# Patient Record
Sex: FEMALE | Race: BLACK OR AFRICAN AMERICAN | NOT HISPANIC OR LATINO | Employment: FULL TIME | URBAN - METROPOLITAN AREA
[De-identification: names, ages, dates, MRNs, and addresses within clinical notes are randomized per-mention and may not be internally consistent; named-entity substitution may affect disease eponyms.]

---

## 2020-07-14 ENCOUNTER — HOSPITAL ENCOUNTER (EMERGENCY)
Facility: HOSPITAL | Age: 20
Discharge: HOME | End: 2020-07-14
Attending: EMERGENCY MEDICINE
Payer: COMMERCIAL

## 2020-07-14 VITALS
TEMPERATURE: 97.8 F | RESPIRATION RATE: 18 BRPM | OXYGEN SATURATION: 100 % | WEIGHT: 158.8 LBS | SYSTOLIC BLOOD PRESSURE: 133 MMHG | DIASTOLIC BLOOD PRESSURE: 70 MMHG | HEART RATE: 67 BPM

## 2020-07-14 DIAGNOSIS — R11.2 NAUSEA AND VOMITING: ICD-10-CM

## 2020-07-14 DIAGNOSIS — R10.30 LOWER ABDOMINAL PAIN: Primary | ICD-10-CM

## 2020-07-14 LAB
BACTERIA UR QL AUTO: ABNORMAL /HPF
BILIRUB UR QL STRIP: ABNORMAL
CLARITY UR: ABNORMAL
COLOR UR: YELLOW
COLOR, POC: NORMAL
EXT PREG TEST URINE: NEGATIVE
EXT. CONTROL ED NAV: NORMAL
GLUCOSE UR STRIP-MCNC: NEGATIVE MG/DL
HGB UR QL STRIP.AUTO: ABNORMAL
KETONES UR STRIP-MCNC: ABNORMAL MG/DL
LEUKOCYTE ESTERASE UR QL STRIP: NEGATIVE
NITRITE UR QL STRIP: NEGATIVE
NON-SQ EPI CELLS URNS QL MICRO: ABNORMAL /HPF
PH UR STRIP.AUTO: 5.5 [PH] (ref 4.5–8)
PROT UR STRIP-MCNC: ABNORMAL MG/DL
RBC #/AREA URNS AUTO: ABNORMAL /HPF
SP GR UR STRIP.AUTO: >=1.03 (ref 1–1.03)
UROBILINOGEN UR QL STRIP.AUTO: 0.2 E.U./DL
WBC #/AREA URNS AUTO: ABNORMAL /HPF

## 2020-07-14 PROCEDURE — 99284 EMERGENCY DEPT VISIT MOD MDM: CPT | Performed by: EMERGENCY MEDICINE

## 2020-07-14 PROCEDURE — 99284 EMERGENCY DEPT VISIT MOD MDM: CPT

## 2020-07-14 PROCEDURE — 81025 URINE PREGNANCY TEST: CPT | Performed by: EMERGENCY MEDICINE

## 2020-07-14 PROCEDURE — 96372 THER/PROPH/DIAG INJ SC/IM: CPT

## 2020-07-14 PROCEDURE — 81001 URINALYSIS AUTO W/SCOPE: CPT

## 2020-07-14 RX ORDER — ONDANSETRON 4 MG/1
4 TABLET, ORALLY DISINTEGRATING ORAL ONCE
Status: COMPLETED | OUTPATIENT
Start: 2020-07-14 | End: 2020-07-14

## 2020-07-14 RX ORDER — KETOROLAC TROMETHAMINE 30 MG/ML
15 INJECTION, SOLUTION INTRAMUSCULAR; INTRAVENOUS ONCE
Status: COMPLETED | OUTPATIENT
Start: 2020-07-14 | End: 2020-07-14

## 2020-07-14 RX ORDER — ACETAMINOPHEN 325 MG/1
650 TABLET ORAL ONCE
Status: DISCONTINUED | OUTPATIENT
Start: 2020-07-14 | End: 2020-07-14 | Stop reason: HOSPADM

## 2020-07-14 RX ORDER — ONDANSETRON 4 MG/1
4 TABLET, FILM COATED ORAL EVERY 6 HOURS
Qty: 12 TABLET | Refills: 0 | Status: SHIPPED | OUTPATIENT
Start: 2020-07-14

## 2020-07-14 RX ADMIN — KETOROLAC TROMETHAMINE 15 MG: 30 INJECTION, SOLUTION INTRAMUSCULAR at 13:29

## 2020-07-14 RX ADMIN — ONDANSETRON 4 MG: 4 TABLET, ORALLY DISINTEGRATING ORAL at 13:29

## 2020-07-14 NOTE — ED ATTENDING ATTESTATION
7/14/2020  Last WHITMORE [id number], DO, saw and evaluated the patient  I have discussed the patient with the resident/non-physician practitioner and agree with the resident's/non-physician practitioner's findings, Plan of Care, and MDM as documented in the resident's/non-physician practitioner's note, except where noted  All available labs and Radiology studies were reviewed  I was present for key portions of any procedure(s) performed by the resident/non-physician practitioner and I was immediately available to provide assistance  At this point I agree with the current assessment done in the Emergency Department  I have conducted an independent evaluation of this patient a history and physical is as follows:    Patient is a 17-year-old female, presents today for evaluation of abdominal pelvic cramping and nausea and vomiting  She says she has a history of heavy menstrual cycles with significant cramping  Today she started her  menstrual cycle, her cycles are normally irregular, and while in a training session for work, felt nauseous, went into the bathroom and had 2 episodes of nonbloody, nonbilious emesis  Her discomfort is diffuse but greatest in the left lower quadrant  Was gradual in onset, not maximal in onset and not sudden in onset  Feels like her prior menstrual cycles but just worse cramping  There is no travel history, no sick contacts, no dysuria or hematuria  She is not sexually active  No anorexia or migration to the symptoms  No cough, no fever or chills  She says while vomiting at work, someone called EMS  General:  Patient is well-appearing  Head:  Atraumatic  Eyes:  Conjunctiva pink  ENT:  Mucous membranes are moist  Neck:  Supple  Cardiac:  S1-S2, without murmurs  Lungs:  Clear to auscultation bilaterally  Abdomen:  Mild diffuse abdominal tenderness, greatest in the left lower quadrant    No tympany, no rigidity, no guarding, no CVA tenderness  Extremities:  Normal range of motion  Neurologic:  Awake, fluent speech, normal comprehension, AAOx3  Skin:  Pink warm and dry  Psychiatric:  Alert, pleasant, cooperative      ED Course     Labs Reviewed   URINE MACROSCOPIC, POC - Abnormal       Result Value Ref Range Status    Color, UA Yellow   Final    Clarity, UA Cloudy   Final    pH, UA 5 5  4 5 - 8 0 Final    Leukocytes, UA Negative  Negative Final    Nitrite, UA Negative  Negative Final    Protein, UA 30 (1+) (*) Negative mg/dl Final    Glucose, UA Negative  Negative mg/dl Final    Ketones, UA >=160 (4+) (*) Negative mg/dl Final    Urobilinogen, UA 0 2  0 2, 1 0 E U /dl E U /dl Final    Bilirubin, UA Interference- unable to analyze (*) Negative Final    Comment: The dipstick result may be falsely positive due to interfering substances  We recommend reliance upon serum bilirubin, liver & kidney function tests to guide patient care if clinically indicated  Blood, UA Large (*) Negative Final    Specific Gravity, UA >=1 030  1 003 - 1 030 Final    Narrative:     CLINITEK RESULT   POCT URINALYSIS DIPSTICK - Normal    Color, UA see results      POCT PREGNANCY, URINE - Normal    EXT PREG TEST UR (Ref: Negative) negative   Final    Control valid   Final   URINE MICROSCOPIC       On reassessment the patient was feeling better  Suspect her symptoms are secondary her menstrual cramping  Do not believe this represents acute ovarian torsion  While the cause of the patient's complaints is most likely benign, it is possible that this is the early presentation of a more serious condition  This diagnostic uncertainty was discussed with the patient, the importance of follow up care, as well as the need to return to immediately return to the closest emergency department for the concerning signs and symptoms listed in the discharge instructions  The patient stated they were aware of this diagnostic uncertainty, understood the importance of follow up and were comfortable being discharged   I believe that discharge home with outpatient follow up for further evaluation is medically appropriate  Supportive care, importance of follow-up and return precautions were discussed with the patient, who expressed understanding            Critical Care Time  Procedures

## 2020-07-14 NOTE — ED PROVIDER NOTES
History  Chief Complaint   Patient presents with    Abdominal Pain     pt reports abdominal pain and cramping that started around 1030, reprots two episodes of vomiting and nausea; denies D/C     23year-old female history of heavy menstrual bleeding and significant cramping presents for evaluation of lower abdominal pain and nausea and vomiting  She states her menses began this morning and while at orientation for a job, experienced lower abdominal cramping associated with nausea and 2 episodes of nonbloody nonbilious vomiting  Someone in the bathroom then called EMS  She states the pain is primarily in her lower abdomen is cramping in nature  The pain feels similar to prior menses  It is nonradiating  No obvious worsening or alleviating factors  She did not take any medications prior to arrival   No recent trauma or falls  No prior abdominal surgeries  No constipation or diarrhea  No recent antibiotic use or hospitalizations  No recent travel outside of the country  She is not currently sexually active  She denies any dysuria, hematuria, urinary urgency or frequency  No vaginal discharge  No headache, fever, chills, chest pain, shortness of breath  None       History reviewed  No pertinent past medical history  History reviewed  No pertinent surgical history  History reviewed  No pertinent family history  I have reviewed and agree with the history as documented  E-Cigarette/Vaping    E-Cigarette Use Never User      E-Cigarette/Vaping Substances     Social History     Tobacco Use    Smoking status: Current Some Day Smoker     Types: Cigars    Smokeless tobacco: Never Used   Substance Use Topics    Alcohol use: Not Currently     Comment: socailly     Drug use: Not Currently        Review of Systems   Constitutional: Negative for activity change, chills, diaphoresis and fever  HENT: Negative for congestion, rhinorrhea, sore throat and tinnitus      Eyes: Negative for photophobia, redness and visual disturbance  Respiratory: Negative for cough, chest tightness, shortness of breath and wheezing  Cardiovascular: Negative for chest pain, palpitations and leg swelling  Gastrointestinal: Positive for abdominal pain, nausea and vomiting  Negative for abdominal distention, blood in stool, constipation and diarrhea  Genitourinary: Positive for vaginal bleeding  Negative for dysuria, enuresis, flank pain, frequency, hematuria, urgency, vaginal discharge and vaginal pain  Musculoskeletal: Negative for neck pain and neck stiffness  Skin: Negative for rash and wound  Neurological: Negative for dizziness, seizures, syncope, light-headedness and headaches  Hematological: Negative  Psychiatric/Behavioral: Negative  Physical Exam  ED Triage Vitals [07/14/20 1308]   Temperature Pulse Respirations Blood Pressure SpO2   97 8 °F (36 6 °C) 67 18 133/70 100 %      Temp src Heart Rate Source Patient Position - Orthostatic VS BP Location FiO2 (%)   -- Monitor Lying Left arm --      Pain Score       9             Orthostatic Vital Signs  Vitals:    07/14/20 1308   BP: 133/70   Pulse: 67   Patient Position - Orthostatic VS: Lying       Physical Exam   Constitutional: She is oriented to person, place, and time  She appears well-developed and well-nourished  No distress  HENT:   Head: Normocephalic and atraumatic  Right Ear: External ear normal    Left Ear: External ear normal    Eyes: Pupils are equal, round, and reactive to light  Conjunctivae and EOM are normal    Neck: Normal range of motion  Neck supple  Cardiovascular: Normal rate, regular rhythm, normal heart sounds and intact distal pulses  Exam reveals no gallop and no friction rub  No murmur heard  Pulmonary/Chest: Effort normal and breath sounds normal  No stridor  No respiratory distress  She has no wheezes  Abdominal: Soft  Bowel sounds are normal  She exhibits no distension   There is tenderness in the right lower quadrant, suprapubic area and left lower quadrant  There is no rigidity, no rebound, no guarding, no CVA tenderness and negative Anne's sign  Musculoskeletal: Normal range of motion  She exhibits no edema, tenderness or deformity  Neurological: She is alert and oriented to person, place, and time  No cranial nerve deficit or sensory deficit  She exhibits normal muscle tone  Skin: Skin is warm and dry  Capillary refill takes less than 2 seconds  No rash noted  No erythema  Psychiatric: She has a normal mood and affect  Her behavior is normal    Nursing note and vitals reviewed        ED Medications  Medications   ketorolac (TORADOL) injection 15 mg (15 mg Intramuscular Given 7/14/20 1329)   ondansetron (ZOFRAN-ODT) dispersible tablet 4 mg (4 mg Oral Given 7/14/20 1329)       Diagnostic Studies  Results Reviewed     Procedure Component Value Units Date/Time    Urine Microscopic [[id number]]  (Abnormal) Collected:  07/14/20 1430    Lab Status:  Final result Specimen:  Urine, Clean Catch Updated:  07/14/20 1558     RBC, UA 30-50 /hpf      WBC, UA 4-10 /hpf      Epithelial Cells Occasional /hpf      Bacteria, UA Occasional /hpf     POCT urinalysis dipstick [[id number]]  (Normal) Resulted:  07/14/20 1435    Lab Status:  Edited Result - FINAL Specimen:  Urine Updated:  07/14/20 1435     Color, UA see results    POCT pregnancy, urine [[id number]]  (Normal) Resulted:  07/14/20 1430    Lab Status:  Final result Updated:  07/14/20 1430     EXT PREG TEST UR (Ref: Negative) negative     Control valid    Urine Macroscopic, POC [[id number]]  (Abnormal) Collected:  07/14/20 1430    Lab Status:  Final result Specimen:  Urine Updated:  07/14/20 1429     Color, UA Yellow     Clarity, UA Cloudy     pH, UA 5 5     Leukocytes, UA Negative     Nitrite, UA Negative     Protein, UA 30 (1+) mg/dl      Glucose, UA Negative mg/dl      Ketones, UA >=160 (4+) mg/dl      Urobilinogen, UA 0 2 E U /dl      Bilirubin, UA Interference- unable to analyze     Blood, UA Large     Specific Gravity, UA >=1 030    Narrative:       CLINITEK RESULT                 No orders to display         Procedures  Procedures      ED Course  ED Course as of Jul 14 1738 Tue Jul 14, 2020   1309 Blood Pressure: 133/70   1309 Temperature: 97 8 °F (36 6 °C)   1309 Pulse: 67   1309 Respirations: 18   1309 SpO2: 100 %   1430 Ketones, UA(!): >=160 (4+)   1430 Leukocytes, UA: Negative   1430 Nitrite, UA: Negative   1430 Blood, UA(!): Large   1443 PREGNANCY TEST URINE: negative                                           MDM  Number of Diagnoses or Management Options  Lower abdominal pain:   Nausea and vomiting:   Diagnosis management comments: 55-year-old female presents for evaluation of lower abdominal cramping pain associated with nausea and vomiting  On exam patient is overall well appearing in no acute distress  She has diffuse tenderness in her lower abdomen slightly worse than left lower quadrant  No peritoneal signs  Will check urinalysis and urine preg  Will treat symptomatically  Urinalysis grossly unremarkable  Patient's symptoms improved following medications  She will be discharged home with prescription for Zofran  She was also given information to establish care with an OBGYN  Was given strict return precautions  Disposition  Final diagnoses:   Lower abdominal pain   Nausea and vomiting     Time reflects when diagnosis was documented in both MDM as applicable and the Disposition within this note     Time User Action Codes Description Comment    7/14/2020  2:21 PM Check, Devon Mortimer Add [R10 30] Lower abdominal pain     7/14/2020  2:21 PM Check, Devon Mortimer Add [R11 2] Nausea and vomiting       ED Disposition     ED Disposition Condition Date/Time Comment    Discharge Stable Tue Jul 14, 2020  2:21 PM Serg Balbuena discharge to home/self care              Follow-up Information     Follow up With Specialties Details Why Contact Info Additional Information    Rogelio Patel MD Pediatrics  As needed [facility] Emergency Department Emergency Medicine  If symptoms worsen [address]  [phone]  ED, [address] [place] [address] Obstetrics and Gynecology Schedule an appointment as soon as possible for a visit   [address], [address] [facility] [address]  [facility], [address], [address] [place], [place], [address] [phone]          Discharge Medication List as of 7/14/2020  2:51 PM      START taking these medications    Details   ondansetron (ZOFRAN) 4 mg tablet Take 1 tablet (4 mg total) by mouth every 6 (six) hours, Starting Tue 7/14/2020, Normal           No discharge procedures on file  PDMP Review     None           ED Provider  Attending physically available and evaluated Carlos Albertomalgorzata Austin WHITMORE managed the patient along with the ED Attending      Electronically Signed by         Mario Goldstein MD  07/14/20 1975

## 2020-07-14 NOTE — DISCHARGE INSTRUCTIONS
Return to the emergency department if you experience worsening symptoms including worsening pain, if you feel lightheaded or have episodes of losing consciousness, uncontrollable vomiting, or large amounts of vaginal bleeding or your saturating through multiple pads an hour      Recommend Tylenol and ibuprofen for pain    Call the number above to establish care with OBGYN for further management evaluation

## 2021-04-21 ENCOUNTER — TELEPHONE (OUTPATIENT)
Dept: PEDIATRICS CLINIC | Facility: CLINIC | Age: 21
End: 2021-04-21

## 2021-05-21 NOTE — TELEPHONE ENCOUNTER
05/21/21 10:03 AM     Thank you for your request  Your request has been received, reviewed, and the patient chart updated  The PCP has successfully been removed with a patient attribution note  This message will now be completed      Thank you  Christopher Hernandez

## 2021-07-26 ENCOUNTER — HOSPITAL ENCOUNTER (EMERGENCY)
Facility: HOSPITAL | Age: 21
Discharge: HOME | End: 2021-07-26
Attending: EMERGENCY MEDICINE
Payer: COMMERCIAL

## 2021-07-26 VITALS
SYSTOLIC BLOOD PRESSURE: 121 MMHG | OXYGEN SATURATION: 98 % | WEIGHT: 160 LBS | TEMPERATURE: 98.4 F | RESPIRATION RATE: 12 BRPM | HEART RATE: 98 BPM | DIASTOLIC BLOOD PRESSURE: 78 MMHG

## 2021-07-26 DIAGNOSIS — L02.31 ABSCESS OF RIGHT BUTTOCK: Primary | ICD-10-CM

## 2021-07-26 PROCEDURE — 99284 EMERGENCY DEPT VISIT MOD MDM: CPT | Performed by: EMERGENCY MEDICINE

## 2021-07-26 PROCEDURE — 10061 I&D ABSCESS COMP/MULTIPLE: CPT | Performed by: EMERGENCY MEDICINE

## 2021-07-26 PROCEDURE — 99282 EMERGENCY DEPT VISIT SF MDM: CPT

## 2021-07-26 RX ORDER — CLINDAMYCIN HYDROCHLORIDE 150 MG/1
450 CAPSULE ORAL ONCE
Status: COMPLETED | OUTPATIENT
Start: 2021-07-26 | End: 2021-07-26

## 2021-07-26 RX ORDER — IBUPROFEN 400 MG/1
400 TABLET ORAL ONCE
Status: COMPLETED | OUTPATIENT
Start: 2021-07-26 | End: 2021-07-26

## 2021-07-26 RX ORDER — LIDOCAINE HYDROCHLORIDE AND EPINEPHRINE 10; 10 MG/ML; UG/ML
10 INJECTION, SOLUTION INFILTRATION; PERINEURAL ONCE
Status: COMPLETED | OUTPATIENT
Start: 2021-07-26 | End: 2021-07-26

## 2021-07-26 RX ORDER — ACETAMINOPHEN 325 MG/1
650 TABLET ORAL ONCE
Status: COMPLETED | OUTPATIENT
Start: 2021-07-26 | End: 2021-07-26

## 2021-07-26 RX ORDER — CLINDAMYCIN HYDROCHLORIDE 300 MG/1
300 CAPSULE ORAL 3 TIMES DAILY
Qty: 21 CAPSULE | Refills: 0 | Status: SHIPPED | OUTPATIENT
Start: 2021-07-27 | End: 2021-08-03

## 2021-07-26 RX ADMIN — CLINDAMYCIN HYDROCHLORIDE 450 MG: 150 CAPSULE ORAL at 22:13

## 2021-07-26 RX ADMIN — LIDOCAINE HYDROCHLORIDE,EPINEPHRINE BITARTRATE 10 ML: 10; .01 INJECTION, SOLUTION INFILTRATION; PERINEURAL at 22:14

## 2021-07-26 RX ADMIN — IBUPROFEN 400 MG: 400 TABLET ORAL at 22:13

## 2021-07-26 RX ADMIN — ACETAMINOPHEN 650 MG: 325 TABLET, FILM COATED ORAL at 22:13

## 2021-07-27 NOTE — ED PROVIDER NOTES
History  Chief Complaint   Patient presents with    Wound Infection     pt states "I have a boil on my right buttock"     Patient is a 60-year-old female seen in the emergency department with concern for apparent abscess to right buttock  Patient notes right medial buttock pain over approximately the past week  Patient notes history of abscess in a similar location approximately 1 month ago, which resolved after drainage at home  Patient notes pain worse with palpation  Patient states that she has been using warm compresses at home, with minimal relief  Patient notes no fever, abdominal pain, vomiting, diarrhea, weakness  Prior to Admission Medications   Prescriptions Last Dose Informant Patient Reported? Taking?   ondansetron (ZOFRAN) 4 mg tablet   No No   Sig: Take 1 tablet (4 mg total) by mouth every 6 (six) hours      Facility-Administered Medications: None       History reviewed  No pertinent past medical history  History reviewed  No pertinent surgical history  History reviewed  No pertinent family history  I have reviewed and agree with the history as documented  E-Cigarette/Vaping    E-Cigarette Use Current Every Day User     Cartridges/Day 1      E-Cigarette/Vaping Substances    Nicotine Yes     Flavoring Yes      Social History     Tobacco Use    Smoking status: Former Smoker     Types: Cigars    Smokeless tobacco: Never Used   Vaping Use    Vaping Use: Every day    Substances: Nicotine, Flavoring   Substance Use Topics    Alcohol use: Not Currently     Comment: socailly     Drug use: Not Currently       Review of Systems   Constitutional: Negative for chills and fever  HENT: Negative for ear pain and sore throat  Eyes: Negative for pain and visual disturbance  Respiratory: Negative for cough and shortness of breath  Cardiovascular: Negative for chest pain and palpitations  Gastrointestinal: Negative for abdominal pain and vomiting     Genitourinary: Negative for dysuria and hematuria  Musculoskeletal: Negative for arthralgias and back pain  Skin: Negative for pallor  Right buttock pain   Neurological: Negative for seizures and syncope  Physical Exam  Physical Exam  Vitals and nursing note reviewed  Constitutional:       General: She is not in acute distress  Appearance: She is well-developed  HENT:      Head: Normocephalic and atraumatic  Right Ear: External ear normal       Left Ear: External ear normal       Nose: Nose normal       Mouth/Throat:      Pharynx: Oropharynx is clear  Eyes:      Conjunctiva/sclera: Conjunctivae normal    Cardiovascular:      Rate and Rhythm: Normal rate  Heart sounds: No murmur heard  Comments: well-perfused extremities  Pulmonary:      Effort: Pulmonary effort is normal  No respiratory distress  Breath sounds: Normal breath sounds  Abdominal:      Palpations: Abdomen is soft  Tenderness: There is no abdominal tenderness  Musculoskeletal:         General: No deformity or signs of injury  Cervical back: Normal range of motion and neck supple  Skin:     General: Skin is warm and dry  Comments: Approximately 2 x 2 cm area of erythema/tenderness/fluctuance over right medial buttock   Neurological:      General: No focal deficit present  Mental Status: She is alert and oriented to person, place, and time  Cranial Nerves: No cranial nerve deficit  Sensory: No sensory deficit     Psychiatric:         Mood and Affect: Mood normal          Behavior: Behavior normal          Vital Signs  ED Triage Vitals [07/26/21 2203]   Temperature Pulse Respirations Blood Pressure SpO2   98 4 °F (36 9 °C) 98 12 121/78 98 %      Temp Source Heart Rate Source Patient Position - Orthostatic VS BP Location FiO2 (%)   Oral Monitor Lying Right arm --      Pain Score       9           Vitals:    07/26/21 2203   BP: 121/78   Pulse: 98   Patient Position - Orthostatic VS: Lying Visual Acuity      ED Medications  Medications   ibuprofen (MOTRIN) tablet 400 mg (400 mg Oral Given 7/26/21 2213)   acetaminophen (TYLENOL) tablet 650 mg (650 mg Oral Given 7/26/21 2213)   clindamycin (CLEOCIN) capsule 450 mg (450 mg Oral Given 7/26/21 2213)   lidocaine-epinephrine (XYLOCAINE/EPINEPHRINE) 1 %-1:100,000 injection 10 mL (10 mL Infiltration Given 7/26/21 2214)       Diagnostic Studies  Results Reviewed     None                 No orders to display              Procedures  Incision and drain    Date/Time: 7/26/2021 10:12 PM  Performed by: Sunny Ross MD  Authorized by: Sunny Ross MD   Universal Protocol:  Consent given by: patient  Patient identity confirmed: verbally with patient      Patient location:  ED  Location:     Type:  Abscess    Size:  2 cm    Location: right buttock  Pre-procedure details:     Skin preparation:  Chloraprep  Procedure details:     Incision types:  Single straight    Scalpel blade:  11    Wound management:  Probed and deloculated and irrigated with saline    Drainage:  Purulent    Drainage amount: Moderate    Wound treatment:  Wound left open  Post-procedure details:     Patient tolerance of procedure: Tolerated well, no immediate complications  Comments:      assisted by RN             ED Course                                           MDM  Number of Diagnoses or Management Options  Abscess of right buttock  Diagnosis management comments: Patient is a 28-year-old female seen in the emergency department with concern for apparent right buttock abscess  Patient was treated with medication for symptom control  Incision and drainage was performed in the emergency department  Patient tolerated the procedure well  Plan to treat patient with course of oral antibiotics, and have patient follow up with PCP  Patient stable for discharge home  Discharge instructions were reviewed with patient        Disposition  Final diagnoses:   Abscess of right buttock Time reflects when diagnosis was documented in both MDM as applicable and the Disposition within this note     Time User Action Codes Description Comment    7/26/2021 10:09 PM Matthew Deng Add [L02 31] Abscess of right buttock       ED Disposition     ED Disposition Condition Date/Time Comment    Discharge Stable Mon Jul 26, 2021 10:25 PM Pino Álvaro discharge to home/self care  Follow-up Information     Follow up With Specialties Details Why Contact Info    Jana Sahni DO Family Medicine Call in 1 day  [address] [facility] [address] [facility]   [phone]            Discharge Medication List as of 7/26/2021 10:30 PM      START taking these medications    Details   clindamycin (CLEOCIN) 300 MG capsule Take 1 capsule (300 mg total) by mouth 3 (three) times a day for 7 days, Starting Tue 7/27/2021, Until Tue 8/3/2021, Normal         CONTINUE these medications which have NOT CHANGED    Details   ondansetron (ZOFRAN) 4 mg tablet Take 1 tablet (4 mg total) by mouth every 6 (six) hours, Starting Tue 7/14/2020, Normal           No discharge procedures on file      PDMP Review     None          ED Provider  Electronically Signed by           Darren Nichole MD  07/26/21 3714

## 2021-07-27 NOTE — DISCHARGE INSTRUCTIONS
Take antibiotic medication as directed  Follow up with your primary doctor, and return to the emergency department for new or worsening symptoms

## 2021-11-22 ENCOUNTER — NURSE TRIAGE (OUTPATIENT)
Dept: OTHER | Facility: OTHER | Age: 21
End: 2021-11-22

## 2022-02-12 ENCOUNTER — HOSPITAL ENCOUNTER (EMERGENCY)
Facility: HOSPITAL | Age: 22
Discharge: HOME | End: 2022-02-12
Attending: INTERNAL MEDICINE | Admitting: INTERNAL MEDICINE
Payer: COMMERCIAL

## 2022-02-12 ENCOUNTER — APPOINTMENT (EMERGENCY)
Dept: RADIOLOGY | Facility: HOSPITAL | Age: 22
End: 2022-02-12
Payer: COMMERCIAL

## 2022-02-12 VITALS
SYSTOLIC BLOOD PRESSURE: 141 MMHG | HEART RATE: 81 BPM | OXYGEN SATURATION: 100 % | BODY MASS INDEX: 25.84 KG/M2 | RESPIRATION RATE: 18 BRPM | DIASTOLIC BLOOD PRESSURE: 89 MMHG | TEMPERATURE: 97.9 F | WEIGHT: 140.43 LBS | HEIGHT: 62 IN

## 2022-02-12 DIAGNOSIS — K59.00 CONSTIPATION, UNSPECIFIED CONSTIPATION TYPE: Primary | ICD-10-CM

## 2022-02-12 PROCEDURE — 99283 EMERGENCY DEPT VISIT LOW MDM: CPT

## 2022-02-12 PROCEDURE — 99282 EMERGENCY DEPT VISIT SF MDM: CPT | Performed by: INTERNAL MEDICINE

## 2022-02-12 PROCEDURE — 74018 RADEX ABDOMEN 1 VIEW: CPT

## 2022-02-12 RX ORDER — AMOXICILLIN 250 MG
1 CAPSULE ORAL DAILY
Qty: 20 TABLET | Refills: 0 | Status: SHIPPED | OUTPATIENT
Start: 2022-02-12

## 2022-02-12 RX ORDER — MAGNESIUM CARB/ALUMINUM HYDROX 105-160MG
296 TABLET,CHEWABLE ORAL ONCE
Status: COMPLETED | OUTPATIENT
Start: 2022-02-12 | End: 2022-02-12

## 2022-02-12 RX ADMIN — MAGNESIUM CITRATE 296 ML: 1.75 LIQUID ORAL at 14:53

## 2022-02-12 NOTE — ED PROVIDER NOTES
History  Chief Complaint   Patient presents with    Constipation     abdominal pain x 2 weeks and constipation, last BM 2-3 days ago, trying prune juice, eating protein and enema today without relief  Are this is a 24years old came for having constipation  Patient stated that she did move her bowel for 2-3 days  Patient take a lot of protein , prone juice, and today she gave herself anymore  Patient stated last normal bowel movement was about 2 weeks ago  Patient has no abdominal surgery  Last menstruation was 2/8/22  Patient denies any urinary symptoms  Prior to Admission Medications   Prescriptions Last Dose Informant Patient Reported? Taking?   ondansetron (ZOFRAN) 4 mg tablet   No No   Sig: Take 1 tablet (4 mg total) by mouth every 6 (six) hours      Facility-Administered Medications: None       History reviewed  No pertinent past medical history  History reviewed  No pertinent surgical history  History reviewed  No pertinent family history  I have reviewed and agree with the history as documented  E-Cigarette/Vaping    E-Cigarette Use Current Every Day User     Cartridges/Day 1      E-Cigarette/Vaping Substances    Nicotine Yes     Flavoring Yes      Social History     Tobacco Use    Smoking status: Current Every Day Smoker     Types: Cigars    Smokeless tobacco: Never Used   Vaping Use    Vaping Use: Every day    Substances: Nicotine, Flavoring   Substance Use Topics    Alcohol use: Not Currently     Comment: socailly     Drug use: Not Currently       Review of Systems   Constitutional: Negative for fatigue and fever  HENT: Negative for congestion, rhinorrhea, sinus pressure, sinus pain, sore throat and trouble swallowing  Respiratory: Negative for cough and shortness of breath  Cardiovascular: Negative for chest pain and palpitations  Gastrointestinal: Positive for constipation  Negative for abdominal pain, diarrhea, nausea and vomiting     Genitourinary: Negative for difficulty urinating, dysuria, flank pain and frequency  Musculoskeletal: Negative for back pain, myalgias, neck pain and neck stiffness  Skin: Negative for color change, pallor and rash  Neurological: Negative for dizziness, light-headedness and headaches  Psychiatric/Behavioral: Negative for agitation and behavioral problems  Physical Exam  Physical Exam  Constitutional:       General: She is not in acute distress  Appearance: Normal appearance  She is well-developed  She is not ill-appearing, toxic-appearing or diaphoretic  HENT:      Head: Normocephalic and atraumatic  Nose: Nose normal  No congestion or rhinorrhea  Mouth/Throat:      Pharynx: No oropharyngeal exudate  Neck:      Vascular: No carotid bruit  Cardiovascular:      Rate and Rhythm: Normal rate and regular rhythm  Heart sounds: Normal heart sounds  No murmur heard  No friction rub  No gallop  Pulmonary:      Effort: Pulmonary effort is normal  No respiratory distress  Breath sounds: Normal breath sounds  No wheezing, rhonchi or rales  Chest:      Chest wall: No tenderness  Abdominal:      General: Bowel sounds are normal  There is no distension  Palpations: Abdomen is soft  There is no mass  Tenderness: There is no abdominal tenderness  There is no right CVA tenderness, left CVA tenderness, guarding or rebound  Hernia: No hernia is present  Musculoskeletal:         General: No swelling, tenderness, deformity or signs of injury  Normal range of motion  Cervical back: Normal range of motion and neck supple  No rigidity or tenderness  Right lower leg: No edema  Left lower leg: No edema  Lymphadenopathy:      Cervical: No cervical adenopathy  Skin:     General: Skin is warm and dry  Capillary Refill: Capillary refill takes less than 2 seconds  Neurological:      Mental Status: She is alert and oriented to person, place, and time     Psychiatric: Behavior: Behavior normal          Vital Signs  ED Triage Vitals [02/12/22 1441]   Temperature Pulse Respirations Blood Pressure SpO2   97 9 °F (36 6 °C) 81 18 141/89 100 %      Temp Source Heart Rate Source Patient Position - Orthostatic VS BP Location FiO2 (%)   Oral Monitor Sitting Left arm --      Pain Score       7           Vitals:    02/12/22 1441   BP: 141/89   Pulse: 81   Patient Position - Orthostatic VS: Sitting         Visual Acuity      ED Medications  Medications   magnesium citrate (CITROMA) oral solution 296 mL (296 mL Oral Given 2/12/22 1453)       Diagnostic Studies  Results Reviewed     None                 XR abdomen 1 view kub   Final Result by Delio Dockery MD (02/13 3812)      Unremarkable abdomen  Workstation performed: [id number]                    Procedures  Procedures         ED Course  ED Course as of 02/14/22 1810   Sat Feb 12, 2022   1513 X ray abdomen , stool at R>L colon, no fecal impation                                             MDM  Number of Diagnoses or Management Options  Diagnosis management comments: A this is a 24years old came for having constipation  Patient stated that she did not move her bowels for 2-3 days  Patient take prune juice and she take proteins  X-ray was done shows no fecal impaction and a normal pattern of stool right more than left colon  Case discussed with the patient and her mother  Patient receive magnesium citrate at the ER  Again discharge patient on Dulcolax and senna         Amount and/or Complexity of Data Reviewed  Tests in the radiology section of CPT®: ordered and reviewed    Risk of Complications, Morbidity, and/or Mortality  Presenting problems: low  Diagnostic procedures: low  Management options: low        Disposition  Final diagnoses:   Constipation, unspecified constipation type     Time reflects when diagnosis was documented in both MDM as applicable and the Disposition within this note     Time User Action Codes Description Comment    2/12/2022  3:20 PM Jer Alonzo Add [K59 00] Constipation, unspecified constipation type       ED Disposition     ED Disposition Condition Date/Time Comment    Discharge Stable Sat Feb 12, 2022  3:20 PM Serg Balbuena discharge to home/self care  Follow-up Information     Follow up With Specialties Details Why Contact Info Additional Information    [facility] [address] In 3 days  [address]  [place] [address] [address] [phone] CQ [id number] [address] [facility], [address] [facility] [address], [place], [place], [address], [phone]          Discharge Medication List as of 2/12/2022  3:23 PM      START taking these medications    Details   bisacodyl (DULCOLAX) 5 mg EC tablet Take 1 tablet (5 mg total) by mouth 1 (one) time for 1 dose, Starting Sat 2/12/2022, Normal      senna-docusate sodium (SENOKOT S) 8 6-50 mg per tablet Take 1 tablet by mouth daily, Starting Sat 2/12/2022, Normal         CONTINUE these medications which have NOT CHANGED    Details   ondansetron (ZOFRAN) 4 mg tablet Take 1 tablet (4 mg total) by mouth every 6 (six) hours, Starting Tue 7/14/2020, Normal             No discharge procedures on file      PDMP Review     None          ED Provider  Electronically Signed by           Angel Adames MD  02/14/22 3548

## 2022-03-01 ENCOUNTER — OFFICE VISIT (OUTPATIENT)
Dept: OBGYN CLINIC | Facility: CLINIC | Age: 22
End: 2022-03-01

## 2022-03-01 VITALS
HEIGHT: 62 IN | SYSTOLIC BLOOD PRESSURE: 115 MMHG | WEIGHT: 138 LBS | HEART RATE: 105 BPM | BODY MASS INDEX: 25.4 KG/M2 | DIASTOLIC BLOOD PRESSURE: 83 MMHG

## 2022-03-01 DIAGNOSIS — Z01.419 ENCOUNTER FOR GYNECOLOGICAL EXAMINATION WITHOUT ABNORMAL FINDING: Primary | ICD-10-CM

## 2022-03-01 DIAGNOSIS — Z20.2 POSSIBLE EXPOSURE TO STD: ICD-10-CM

## 2022-03-01 DIAGNOSIS — Z13.31 SCREENING FOR DEPRESSION: ICD-10-CM

## 2022-03-01 DIAGNOSIS — N92.0 MENORRHAGIA WITH REGULAR CYCLE: ICD-10-CM

## 2022-03-01 PROCEDURE — 0503F POSTPARTUM CARE VISIT: CPT | Performed by: NURSE PRACTITIONER

## 2022-03-01 PROCEDURE — G0124 SCREEN C/V THIN LAYER BY MD: HCPCS | Performed by: PATHOLOGY

## 2022-03-01 PROCEDURE — G0145 SCR C/V CYTO,THINLAYER,RESCR: HCPCS | Performed by: PATHOLOGY

## 2022-03-01 PROCEDURE — 99385 PREV VISIT NEW AGE 18-39: CPT | Performed by: NURSE PRACTITIONER

## 2022-03-01 PROCEDURE — 87591 N.GONORRHOEAE DNA AMP PROB: CPT | Performed by: NURSE PRACTITIONER

## 2022-03-01 PROCEDURE — 87491 CHLMYD TRACH DNA AMP PROBE: CPT | Performed by: NURSE PRACTITIONER

## 2022-03-01 NOTE — PATIENT INSTRUCTIONS
Menorrhagia   WHAT YOU NEED TO KNOW:   Menorrhagia is heavy menstrual bleeding for more than 7 days or severe menstrual bleeding for less than 7 days  Your menstrual bleeding and cramping are so heavy that you have trouble doing your usual daily activities  Your monthly period may also occur more often, and you may bleed between periods  Menorrhagia is common in adolescence and around menopause  DISCHARGE INSTRUCTIONS:   Call your local emergency number (911 in the [address] [facility]) for any of the following:   · You have chest pain and shortness of breath  · Your heart is fluttering or beating faster than usual for you  Return to the emergency department if:   · You feel dizzy when you stand  · You feel confused  · You have severe abdominal pain, nausea, and vomiting  · Your skin or the whites of your eyes turn yellow  Call your doctor or gynecologist if:   · You need to change your pad or tampon more than 1 time per hour, for several hours in a row  · You feel more weak and tired than usual     · You have new coldness in your hands and feet  · You have questions or concerns about your condition or care  Medicines: You may need any of the following:  · Iron supplements  may be given if your blood iron level decreases because of heavy bleeding  · NSAIDs , such as ibuprofen, help decrease swelling, pain, and fever  NSAIDs can cause stomach bleeding or kidney problems in certain people  If you take blood thinner medicine, always ask your healthcare provider if NSAIDs are safe for you  Always read the medicine label and follow directions  · Hormones  help slow or stop your bleeding and make your monthly periods more regular  This medicine may be given as birth control pills or an intrauterine device (IUD)  · Take your medicine as directed  Contact your healthcare provider if you think your medicine is not helping or if you have side effects   Tell him of her if you are allergic to any medicine  Keep a list of the medicines, vitamins, and herbs you take  Include the amounts, and when and why you take them  Bring the list or the pill bottles to follow-up visits  Carry your medicine list with you in case of an emergency  Manage your symptoms:   · Keep a supply of pads or tampons with you at all times  If possible, stay close to a bathroom  · Apply heat  on your abdomen to decrease pain and cramps  You can use a heating pad on a low setting  Apply heat for 20 to 30 minutes every 2 hours for as many days as directed  Follow up with your doctor or gynecologist as directed: You may need regular pelvic exams with Pap smears to monitor your condition  Write down your questions so you remember to ask them during your visits  © Copyright [employer] 2022 Information is for End User's use only and may not be sold, redistributed or otherwise used for commercial purposes  All illustrations and images included in CareNotes® are the copyrighted property of A D A M , Inc  or [facility]el [facility]   The above information is an  only  It is not intended as medical advice for individual conditions or treatments  Talk to your doctor, nurse or pharmacist before following any medical regimen to see if it is safe and effective for you

## 2022-03-01 NOTE — PROGRESS NOTES
ASSESSMENT & PLAN: Geovanna Avila is a 24 y o  G0 obstetric history on file  with normal gynecologic exam     1   Routine well woman exam done today  2  Pap:  The patient's last pap - first pap was done today  Pap was done today  Current ASCCP Guidelines reviewed  3   STD testing  was done , testing for [facility] and CT through pap, labs for HIV, RPR, hep b Sag, hep C given to pt to complete  4   Gardasil recommendations reviewed  is vaccinated  5  The following were reviewed in today's visit: breast self exam, STD testing, HIV risk factors and prevention, adequate intake of calcium and vitamin D, exercise, healthy diet and tobacco cessation  6  Screen for depression, scored a 19 on Phq9, denies HI or SI today but has felt this way  Referral to social work  Reviewed can go to any ER if needs to be seen, I also gave her Crisis Intervention Hot line phone numbers  7  RTO to further discuss menorrhagia if desires, currently declines  BMI Counseling: Body mass index is 25 24 kg/m²  The BMI is above normal  Nutrition recommendations include 3-5 servings of fruits/vegetables daily, moderation in carbohydrate intake and increasing intake of lean protein  Exercise recommendations include exercising 3-5 times per week  Depression Screening Follow-up Plan: Patient's depression screening was positive with a PHQ-2 score of 3   Their PHQ-9 score was 19   Patient assessed for underlying major depression  They have no active suicidal ideations  Brief counseling provided and recommend additional follow-up/re-evaluation next office visit  Referral to social work    Tobacco Cessation Counseling: Tobacco cessation counseling was not provided  The patient is sincerely urged to quit consumption of tobacco  She is not ready to quit tobacco  The numerous health risks of tobacco consumption were discussed  Recommended to quit vaping       CC:  Annual Gynecologic Examination    HPI: Geovanna Avila is a 24 y o  G0 obstetric history on file  who presents for annual gynecologic examination  She is a new patient to us  Hx of heavy menses, has used Naproxen in the past  Menses are monthly last 5-7 days, can be heavy and crampy 3-4/7 days, changes pads 4-5 x day when heavy,  Can have clots  At this time denies any treatment  In a same sex relationship  Has been with male partners in the past    States has never had STD testing  Reports she has been losing weight  Weight today 138 lbs, weighed 160 lbs 7/26/2022  Reports increase stress, decrease appetite  To f/u with her PCP  Health Maintenance:    She wears her seatbelt routinely  She does perform irregular monthly self breast exams  She feels safe at home  Past Medical History:   Diagnosis Date    Anemia        History reviewed  No pertinent surgical history  OB/Gyn History:    Pt has menstrual issues  Heavy menses    History of sexually transmitted infection: No   History of abnormal pap smears: No 1st pap done today  Patient is currently sexually active  The current method of family planning is none  OB History    No obstetric history on file           Family History   Problem Relation Age of Onset    No Known Problems Mother     No Known Problems Father     Seizures Sister     No Known Problems Brother     Diabetes Maternal Grandmother     No Known Problems Paternal Grandmother     No Known Problems Paternal Grandfather     Breast cancer Neg Hx     Colon cancer Neg Hx     Ovarian cancer Neg Hx        Social History:  Social History     Socioeconomic History    Marital status: Single     Spouse name: Not on file    Number of children: Not on file    Years of education: Not on file    Highest education level: Not on file   Occupational History    Not on file   Tobacco Use    Smoking status: Former Smoker     Types: Cigars    Smokeless tobacco: Never Used   Vaping Use    Vaping Use: Every day    Substances: Nicotine, Flavoring   Substance and Sexual Activity    Alcohol use: Not Currently     Comment: socailly     Drug use: Not Currently    Sexual activity: Yes     Partners: Female   Other Topics Concern    Not on file   Social History Narrative    Not on file     Social Determinants of Health     Financial Resource Strain: Low Risk     Difficulty of Paying Living Expenses: Not hard at all   Food Insecurity: No Food Insecurity    Worried About [address] in the Last Year: Never true    [address] in the Last Year: Never true   Transportation Needs: No Transportation Needs    Lack of Transportation (Medical): No    Lack of Transportation (Non-Medical): No   Physical Activity: Inactive    Days of Exercise per Week: 0 days    Minutes of Exercise per Session: 0 min   Stress: No Stress Concern Present    Feeling of Stress : Not at all   Social Connections: Socially Isolated    Frequency of Communication with Friends and Family: More than three times a week    Frequency of Social Gatherings with Friends and Family: More than three times a week    Attends Judaism Services: Never    Active Member of Clubs or Organizations: No    Attends Club or Organization Meetings: Never    Marital Status: Never    Intimate Partner Violence: Not At Risk    Fear of Current or Ex-Partner: No    Emotionally Abused: No    Physically Abused: No    Sexually Abused: No   Housing Stability: [address] Unable to Pay for Housing in the Last Year: No    Number of [place] in the Last Year: 1    Unstable Housing in the Last Year: No     Patient is single  Patient is currently unemployed currently looking for work       No Known Allergies      Current Outpatient Medications:     bisacodyl (DULCOLAX) 5 mg EC tablet, Take 1 tablet (5 mg total) by mouth 1 (one) time for 1 dose, Disp: 14 tablet, Rfl: 0    ondansetron (ZOFRAN) 4 mg tablet, Take 1 tablet (4 mg total) by mouth every 6 (six) hours (Patient not taking: Reported on 3/1/2022 ), Disp: 12 tablet, Rfl: 0    senna-docusate sodium (SENOKOT S) 8 6-50 mg per tablet, Take 1 tablet by mouth daily (Patient not taking: Reported on 3/1/2022 ), Disp: 20 tablet, Rfl: 0    Review of Systems:  Constitutional :no fever, feels well, no tiredness, no recent weight gain or loss  ENT: no ear ache, no loss of hearing, no nosebleeds or nasal discharge, no sore throat or hoarseness  Cardiovascular: no complaints of slow or fast heart beat, no chest pain, no palpitations, no leg claudication or lower extremity edema  Respiratory: no complaints of shortness of shortness of breath, no BELL  Breasts:no complaints of breast pain, breast lump, or nipple discharge  Gastrointestinal: no complaints of abdominal pain, constipation, nausea, vomiting, or diarrhea or bloody stools  Genitourinary : no complaints of dysuria, incontinence, pelvic pain, no dysmenorrhea, vaginal discharge or abnormal vaginal bleeding and as noted in HPI  Musculoskeletal: no complaints of arthralgia, no myalgia, no joint swelling or stiffness, no limb pain or swelling    Integumentary: no complaints of skin rash or lesion, itching or dry skin  Neurological: no complaints of headache, no confusion, no numbness or tingling, no dizziness or fainting    Objective      /83 (BP Location: Left arm, Patient Position: Sitting, Cuff Size: Adult)   Pulse 105   Ht 5' 2" (1 575 m)   Wt 62 6 kg (138 lb)   BMI 25 24 kg/m²     General:   appears stated age, cooperative, alert normal mood and affect   Neck: normal, supple,trachea midline, no masses   Heart: regular rate and rhythm, S1, S2 normal, no murmur, click, rub or gallop   Lungs: clear to auscultation bilaterally   Breasts: normal appearance, no masses or tenderness, Inspection negative, No nipple retraction or dimpling, No nipple discharge or bleeding, No axillary or supraclavicular adenopathy, Normal to palpation without dominant masses, Taught monthly breast self examination   Abdomen: soft, non-tender, without masses or organomegaly   Vulva: normal female genitalia, Bartholin's, Urethra, [place]ory normal   Vagina: normal vagina, no discharge, exudate, lesion, or erythema   Urethra: normal   Cervix: Normal, no discharge  PAP done  GCC done  Nontender  Uterus: normal size, contour, position, consistency, mobility, non-tender and anteverted   Adnexa: normal adnexa and no mass, fullness, tenderness   Lymphatic palpation of lymph nodes in neck, axilla, groin and/or other locations: no lymphadenopathy or masses noted   Skin normal skin turgor and no rashes     Psychiatric orientation to person, place, and time: normal  mood and affect: normal

## 2022-03-02 ENCOUNTER — PATIENT OUTREACH (OUTPATIENT)
Dept: OBGYN CLINIC | Facility: CLINIC | Age: 22
End: 2022-03-02

## 2022-03-02 NOTE — PROGRESS NOTES
DONALD TIRADO outreached patient for high depression score  DONALD spoke to patient who lives with her brother, father and PRANAY  She reports that they are all very supportive of her  Patient states that she did complete one year of school at [employer], but had to stop because she could not afford the education and she was not motivated  She is connected with [employer] and is currently trying to get a job  Assessment stopped at this point as phone disconnected  CELESTINO BENNETT tried to call patient back, but she did not   CELESTINO BENNETT left VM

## 2022-03-03 ENCOUNTER — TELEPHONE (OUTPATIENT)
Dept: OBGYN CLINIC | Facility: CLINIC | Age: 22
End: 2022-03-03

## 2022-03-03 LAB
C TRACH DNA SPEC QL NAA+PROBE: NEGATIVE
N GONORRHOEA DNA SPEC QL NAA+PROBE: NEGATIVE

## 2022-03-03 NOTE — TELEPHONE ENCOUNTER
Message left for patient that results are available in [facility]  Encouraged to call office with any questions/concerns

## 2022-03-03 NOTE — TELEPHONE ENCOUNTER
----- Message from Sonia Mckeon sent at 3/3/2022  8:23 AM EST -----    Culture Result is negative, please call patient to inform     Thanks

## 2022-03-07 ENCOUNTER — TELEPHONE (OUTPATIENT)
Dept: OBGYN CLINIC | Facility: CLINIC | Age: 22
End: 2022-03-07

## 2022-03-07 LAB
LAB AP GYN PRIMARY INTERPRETATION: ABNORMAL
Lab: ABNORMAL
PATH INTERP SPEC-IMP: ABNORMAL

## 2022-03-07 NOTE — TELEPHONE ENCOUNTER
L/m to pt to inform pt that her her Pap smear was LGSIL but due to her age will repeat 1 year  Her Pap suggests yeast, If she is symptomatic let shanice know  Otherwise no need to treat If she has any questions to call back office

## 2022-03-28 ENCOUNTER — PATIENT OUTREACH (OUTPATIENT)
Dept: OBGYN CLINIC | Facility: CLINIC | Age: 22
End: 2022-03-28

## 2022-03-29 ENCOUNTER — PATIENT OUTREACH (OUTPATIENT)
Dept: OBGYN CLINIC | Facility: CLINIC | Age: 22
End: 2022-03-29

## 2022-03-31 ENCOUNTER — PATIENT OUTREACH (OUTPATIENT)
Dept: OBGYN CLINIC | Facility: CLINIC | Age: 22
End: 2022-03-31

## 2022-05-27 ENCOUNTER — HOSPITAL ENCOUNTER (EMERGENCY)
Facility: HOSPITAL | Age: 22
Discharge: HOME | End: 2022-05-27
Attending: INTERNAL MEDICINE
Payer: COMMERCIAL

## 2022-05-27 VITALS
DIASTOLIC BLOOD PRESSURE: 82 MMHG | HEART RATE: 85 BPM | TEMPERATURE: 98.1 F | OXYGEN SATURATION: 100 % | RESPIRATION RATE: 16 BRPM | SYSTOLIC BLOOD PRESSURE: 125 MMHG

## 2022-05-27 DIAGNOSIS — Z20.822 ENCOUNTER FOR LABORATORY TESTING FOR COVID-19 VIRUS: Primary | ICD-10-CM

## 2022-05-27 LAB
FLUAV RNA RESP QL NAA+PROBE: POSITIVE
FLUBV RNA RESP QL NAA+PROBE: NEGATIVE
RSV RNA RESP QL NAA+PROBE: NEGATIVE
SARS-COV-2 RNA RESP QL NAA+PROBE: NEGATIVE

## 2022-05-27 PROCEDURE — 99283 EMERGENCY DEPT VISIT LOW MDM: CPT

## 2022-05-27 PROCEDURE — 99282 EMERGENCY DEPT VISIT SF MDM: CPT | Performed by: INTERNAL MEDICINE

## 2022-05-27 PROCEDURE — 0241U HB NFCT DS VIR RESP RNA 4 TRGT: CPT | Performed by: INTERNAL MEDICINE

## 2022-05-27 NOTE — DISCHARGE INSTRUCTIONS
Follow up with the results at my chart, [facility]  Self quarantin for 10 days    Take vit C 1 gm twice daily  Take vit d 2000u TWIc daily

## 2022-05-27 NOTE — Clinical Note
Brigida Moreau was seen and treated in our emergency department on 5/27/2022  No work until cleared by Family Doctor/Orthopedics        Diagnosis:     Cecy Leyva  may return to work on return date  She may return on this date: 05/28/2022    Follow up with your PMD  TAKE MEDICATIONS AS PRESCRIBD    Self quarantin for 10 days  If you have any questions or concerns, please don't hesitate to call        Simona Hand MD    ______________________________           _______________          _______________  Hospital Representative                              Date                                Time

## 2022-07-08 ENCOUNTER — HOSPITAL ENCOUNTER (EMERGENCY)
Facility: HOSPITAL | Age: 22
Discharge: HOME | End: 2022-07-08
Attending: INTERNAL MEDICINE
Payer: COMMERCIAL

## 2022-07-08 VITALS
TEMPERATURE: 98 F | HEIGHT: 62 IN | SYSTOLIC BLOOD PRESSURE: 108 MMHG | HEART RATE: 95 BPM | WEIGHT: 131.2 LBS | OXYGEN SATURATION: 100 % | DIASTOLIC BLOOD PRESSURE: 72 MMHG | RESPIRATION RATE: 18 BRPM | BODY MASS INDEX: 24.14 KG/M2

## 2022-07-08 DIAGNOSIS — S05.02XA ABRASION OF LEFT CORNEA, INITIAL ENCOUNTER: Primary | ICD-10-CM

## 2022-07-08 PROCEDURE — 90471 IMMUNIZATION ADMIN: CPT

## 2022-07-08 PROCEDURE — 99284 EMERGENCY DEPT VISIT MOD MDM: CPT | Performed by: PHYSICIAN ASSISTANT

## 2022-07-08 PROCEDURE — 99283 EMERGENCY DEPT VISIT LOW MDM: CPT

## 2022-07-08 PROCEDURE — 90715 TDAP VACCINE 7 YRS/> IM: CPT | Performed by: PHYSICIAN ASSISTANT

## 2022-07-08 RX ORDER — TETRACAINE HYDROCHLORIDE 5 MG/ML
1 SOLUTION OPHTHALMIC ONCE
Status: COMPLETED | OUTPATIENT
Start: 2022-07-08 | End: 2022-07-08

## 2022-07-08 RX ORDER — CIPROFLOXACIN HYDROCHLORIDE 3.5 MG/ML
1 SOLUTION/ DROPS TOPICAL
Qty: 5 ML | Refills: 0 | Status: SHIPPED | OUTPATIENT
Start: 2022-07-08 | End: 2022-07-15

## 2022-07-08 RX ORDER — CIPROFLOXACIN HYDROCHLORIDE 3.5 MG/ML
1 SOLUTION/ DROPS TOPICAL
Qty: 5 ML | Refills: 0 | Status: SHIPPED | OUTPATIENT
Start: 2022-07-08 | End: 2022-07-08 | Stop reason: SDUPTHER

## 2022-07-08 RX ADMIN — FLUORESCEIN SODIUM 1 STRIP: 0.6 STRIP OPHTHALMIC at 15:11

## 2022-07-08 RX ADMIN — TETRACAINE HYDROCHLORIDE 1 DROP: 5 SOLUTION OPHTHALMIC at 15:11

## 2022-07-08 RX ADMIN — TETANUS TOXOID, REDUCED DIPHTHERIA TOXOID AND ACELLULAR PERTUSSIS VACCINE, ADSORBED 0.5 ML: 5; 2.5; 8; 8; 2.5 SUSPENSION INTRAMUSCULAR at 15:41

## 2022-07-08 NOTE — DISCHARGE INSTRUCTIONS
Please return to the emergency department for worsening symptoms including chest pain, shortness of breath, dizziness, lightheadedness, fever greater than 103, severe pain, inability to walk, fainting episodes, etc  Please follow-up with your family practice provider as soon as possible  I have sent an antibiotic over to your pharmacy  Please take as directed  If symptoms do not improve in the next 2-4 days, please call an ophthalmologist   I have given you Dr Alverna Prader information here

## 2022-07-08 NOTE — Clinical Note
Jazzy Foot was seen and treated in our emergency department on 7/8/2022  Diagnosis:     Eduin Perdue  is off the rest of the shift today  She may return on this date:     Please excuse is individual from her duties on July 8, 2022  If you have any questions or concerns, please don't hesitate to call        Mathew Pham PA-C    ______________________________           _______________          _______________  Hospital Representative                              Date                                Time

## 2022-07-08 NOTE — Clinical Note
Silvana Paris was seen and treated in our emergency department on 7/8/2022  Diagnosis:     Miladis Baugh  is off the rest of the shift today  She may return on this date:     Please excuse is individual from her duties on July 8, 2022  If you have any questions or concerns, please don't hesitate to call        Shree Landers PA-C    ______________________________           _______________          _______________  Hospital Representative                              Date                                Time

## 2022-07-08 NOTE — ED PROVIDER NOTES
History  Chief Complaint   Patient presents with    Eye Problem     Left eye irritation x 4 days, states she thinks she may have scratched it putting her contacts in      This is a 19-year-old female with no significant past medical history presenting to the emergency department today for left eye irritation  It is been ongoing for approximately 4 days  Four days ago, the patient attempted to pull her contact lens out of her eye and believe she scratched her eye at that time  Since then, the patient has had pain, irritation, and redness to the left eye  She denies any fever or chills  She denies any changes to her vision  She denies any purulent discharge from the eye but does note that sometimes her eyes crusty when she wakes up in the morning  She denies any pain when she attempts to move her eye  She denies any foreign body sensation  The patient denies other complaints at this time  History provided by:  Patient   used: No    Eye Problem  Location:  Left eye  Quality:  Stinging  Severity:  Moderate  Onset quality:  Gradual  Duration:  4 days  Timing:  Constant  Progression:  Worsening  Chronicity:  New  Context: contact lenses and scratch    Relieved by:  None tried  Worsened by:  Nothing  Ineffective treatments:  None tried  Associated symptoms: crusting, itching, redness and tearing    Associated symptoms: no blurred vision, no decreased vision, no discharge, no double vision, no facial rash, no headaches, no inflammation, no nausea, no numbness, no photophobia, no scotomas, no swelling, no tingling, no vomiting and no weakness        Prior to Admission Medications   Prescriptions Last Dose Informant Patient Reported?  Taking?   bisacodyl (DULCOLAX) 5 mg EC tablet   No No   Sig: Take 1 tablet (5 mg total) by mouth 1 (one) time for 1 dose   ondansetron (ZOFRAN) 4 mg tablet   No No   Sig: Take 1 tablet (4 mg total) by mouth every 6 (six) hours   Patient not taking: Reported on 3/1/2022    senna-docusate sodium (SENOKOT S) 8 6-50 mg per tablet   No No   Sig: Take 1 tablet by mouth daily   Patient not taking: Reported on 3/1/2022       Facility-Administered Medications: None       Past Medical History:   Diagnosis Date    Anemia        History reviewed  No pertinent surgical history  Family History   Problem Relation Age of Onset    No Known Problems Mother     No Known Problems Father     Seizures Sister     No Known Problems Brother     Diabetes Maternal Grandmother     No Known Problems Paternal Grandmother     No Known Problems Paternal Grandfather     Breast cancer Neg Hx     Colon cancer Neg Hx     Ovarian cancer Neg Hx      I have reviewed and agree with the history as documented  E-Cigarette/Vaping    E-Cigarette Use Current Every Day User     Cartridges/Day 1      E-Cigarette/Vaping Substances    Nicotine Yes     Flavoring Yes      Social History     Tobacco Use    Smoking status: Former Smoker     Types: Cigars    Smokeless tobacco: Never Used   Vaping Use    Vaping Use: Every day    Substances: Nicotine, Flavoring   Substance Use Topics    Alcohol use: Not Currently     Comment: socailly     Drug use: Not Currently       Review of Systems   Constitutional: Negative for appetite change, chills, diaphoresis, fatigue and fever  Eyes: Positive for pain, redness and itching  Negative for blurred vision, double vision, photophobia, discharge and visual disturbance  Respiratory: Negative for cough, chest tightness, shortness of breath and wheezing  Cardiovascular: Negative for chest pain, palpitations and leg swelling  Gastrointestinal: Negative for abdominal pain, blood in stool, constipation, diarrhea, nausea and vomiting  Genitourinary: Negative for dysuria  Musculoskeletal: Negative for neck pain and neck stiffness  Skin: Negative for rash and wound     Neurological: Negative for dizziness, tingling, seizures, syncope, weakness, light-headedness, numbness and headaches  Psychiatric/Behavioral: Negative for confusion  All other systems reviewed and are negative  Physical Exam  Physical Exam  Vitals and nursing note reviewed  Constitutional:       General: She is not in acute distress  Appearance: Normal appearance  She is normal weight  She is not ill-appearing, toxic-appearing or diaphoretic  HENT:      Head: Normocephalic and atraumatic  Right Ear: Tympanic membrane, ear canal and external ear normal  There is no impacted cerumen  Left Ear: Tympanic membrane, ear canal and external ear normal  There is no impacted cerumen  Nose: Nose normal  No congestion or rhinorrhea  Mouth/Throat:      Mouth: Mucous membranes are moist       Pharynx: No oropharyngeal exudate or posterior oropharyngeal erythema  Eyes:      General: No scleral icterus  Right eye: No discharge  Left eye: Discharge present  Extraocular Movements: Extraocular movements intact  Conjunctiva/sclera:      Left eye: Left conjunctiva is injected  Pupils: Pupils are equal, round, and reactive to light  Comments: The patient has scleral injection to the left eye; redness noted; there is some clear drainage that is not purulent; no visual disturbances; no pain with EOMs  No preseptal cellulitis   Cardiovascular:      Rate and Rhythm: Normal rate and regular rhythm  Pulses: Normal pulses  Heart sounds: Normal heart sounds  No murmur heard  No friction rub  No gallop  Pulmonary:      Effort: Pulmonary effort is normal  No respiratory distress  Breath sounds: Normal breath sounds  No stridor  No wheezing, rhonchi or rales  Chest:      Chest wall: No tenderness  Musculoskeletal:         General: Normal range of motion  Cervical back: Normal range of motion  No tenderness  Right lower leg: No edema  Left lower leg: No edema  Skin:     General: Skin is warm and dry  Capillary Refill: Capillary refill takes less than 2 seconds  Coloration: Skin is not jaundiced or pale  Neurological:      General: No focal deficit present  Mental Status: She is alert and oriented to person, place, and time  Mental status is at baseline  Psychiatric:         Mood and Affect: Mood normal          Behavior: Behavior normal          Vital Signs  ED Triage Vitals [07/08/22 1459]   Temperature Pulse Respirations Blood Pressure SpO2   98 °F (36 7 °C) 95 18 108/72 100 %      Temp Source Heart Rate Source Patient Position - Orthostatic VS BP Location FiO2 (%)   Oral -- -- -- --      Pain Score       8           Vitals:    07/08/22 1459   BP: 108/72   Pulse: 95         Visual Acuity  Visual Acuity    Flowsheet Row Most Recent Value   Visual acuity R eye is 20/20   Visual acuity Left eye is 20/40   Visual acuity in both eyes is 20/25   Wearing corrective eyewear/lenses? Yes   L Pupil Size (mm) 3   R Pupil Size (mm) 3   L Pupil Shape Round   R Pupil Shape Round          ED Medications  Medications   tetracaine 0 5 % ophthalmic solution 1 drop (1 drop Right Eye Given 7/8/22 1511)   fluorescein sodium sterile ophthalmic strip 1 strip (1 strip Right Eye Given 7/8/22 1511)   tetanus-diphtheria-acellular pertussis (BOOSTRIX) IM injection 0 5 mL (0 5 mL Intramuscular Given 7/8/22 1541)       Diagnostic Studies  Results Reviewed     None                 No orders to display              Procedures  Procedures         ED Course                                             MDM  Number of Diagnoses or Management Options  Abrasion of left cornea, initial encounter: new and requires workup  Diagnosis management comments: This is a 35-year-old female presenting to the emergency department today for left eye redness and pain for approximately 4 days after she removed the contact from her eye and believes she scratched her eye  She has no change to vision    No purulent drainage from the eye but does note some crusting  Vital signs are stable  Corneal abrasion noted on fluorescein stain  Visual acuity was within normal limits  Tetanus shot was updated  The patient is stable for discharge at this time  Ciloxan sent to the patient's pharmacy  Strict return precautions were given  Recommend Ophthalmology follow-up if symptoms do not improve in 2-4 days  No other foreign bodies were noted on the eye when explored the eye after fluorescein stain  Strict return precautions were given  Recommend PCP follow-up as soon as possible  The patient and/or patient's proxy verify their understanding and agree to the plan at this time  All questions answered to the patient and/or their proxy's satisfaction  All labs reviewed and utilized in the medical decision making process  All radiology studies independently viewed by me and interpreted by the radiologist  Portions of the record may have been created with voice recognition software   Occasional wrong word or "sound a like" substitutions may have occurred due to the inherent limitations of voice recognition software   Read the chart carefully and recognize, using context, where substitutions have occurred  Patient Progress  Patient progress: stable      Disposition  Final diagnoses:   Abrasion of left cornea, initial encounter     Time reflects when diagnosis was documented in both MDM as applicable and the Disposition within this note     Time User Action Codes Description Comment    7/8/2022  3:26 PM Nemesio Nguyen Add [S05  02XA] Abrasion of left cornea, initial encounter       ED Disposition     ED Disposition   Discharge    Condition   Stable    Date/Time   Fri Jul 8, 2022  3:26 PM    Comment   Doug Serna discharge to home/self care                 Follow-up Information     Follow up With Specialties Details [facility] Emergency Medicine Schedule an appointment as soon as possible for a visit   [address]  [address] [facility], [address], [address]    [address] [facility] Go to  If symptoms worsen [address] [id number]  [address] , [place], [place], [address]    Craig Garcia MD Ophthalmology Call   [address]  [address]  [phone]             Discharge Medication List as of 7/8/2022  3:33 PM      CONTINUE these medications which have CHANGED    Details   ciprofloxacin (CILOXAN) 0 3 % ophthalmic solution Administer 1 drop into the left eye every 2 (two) hours for 7 days While awake for two days; then four times for an additional five days, Starting Fri 7/8/2022, Until Fri 7/15/2022, Normal         CONTINUE these medications which have NOT CHANGED    Details   bisacodyl (DULCOLAX) 5 mg EC tablet Take 1 tablet (5 mg total) by mouth 1 (one) time for 1 dose, Starting Sat 2/12/2022, Normal      ondansetron (ZOFRAN) 4 mg tablet Take 1 tablet (4 mg total) by mouth every 6 (six) hours, Starting Tue 7/14/2020, Normal      senna-docusate sodium (SENOKOT S) 8 6-50 mg per tablet Take 1 tablet by mouth daily, Starting Sat 2/12/2022, Normal             No discharge procedures on file      PDMP Review     None          ED Provider  Electronically Signed by           Marguerite Rajan PA-C  07/08/22 4089

## 2022-10-11 PROBLEM — Z13.31 SCREENING FOR DEPRESSION: Status: RESOLVED | Noted: 2022-03-01 | Resolved: 2022-10-11

## 2022-11-23 ENCOUNTER — TELEPHONE (OUTPATIENT)
Dept: OTHER | Facility: OTHER | Age: 22
End: 2022-11-23

## 2022-11-24 NOTE — TELEPHONE ENCOUNTER
Patient called to schedule a NP appt with Gastro  Patient currently scheduled on 12/15/2022 at 10:40 am with Dr Coni Trevino  Patient would like to know if there's a sooner appt

## 2023-01-20 ENCOUNTER — OFFICE VISIT (OUTPATIENT)
Dept: GASTROENTEROLOGY | Facility: CLINIC | Age: 23
End: 2023-01-20

## 2023-01-20 VITALS
HEIGHT: 62 IN | WEIGHT: 131 LBS | SYSTOLIC BLOOD PRESSURE: 104 MMHG | BODY MASS INDEX: 24.11 KG/M2 | DIASTOLIC BLOOD PRESSURE: 84 MMHG | HEART RATE: 99 BPM

## 2023-01-20 DIAGNOSIS — R63.4 WEIGHT LOSS: ICD-10-CM

## 2023-01-20 DIAGNOSIS — R11.0 NAUSEA: ICD-10-CM

## 2023-01-20 DIAGNOSIS — R12 HEARTBURN: Primary | ICD-10-CM

## 2023-01-20 DIAGNOSIS — R10.13 EPIGASTRIC PAIN: ICD-10-CM

## 2023-01-20 DIAGNOSIS — R10.30 LOWER ABDOMINAL PAIN: ICD-10-CM

## 2023-01-20 DIAGNOSIS — K59.00 CONSTIPATION, UNSPECIFIED CONSTIPATION TYPE: ICD-10-CM

## 2023-01-20 RX ORDER — ACETAMINOPHEN 500 MG
500 TABLET ORAL EVERY 6 HOURS PRN
COMMUNITY

## 2023-01-20 RX ORDER — OMEGA-3 FATTY ACIDS/FISH OIL 300-1000MG
CAPSULE ORAL 4 TIMES DAILY PRN
COMMUNITY

## 2023-01-20 RX ORDER — PANTOPRAZOLE SODIUM 40 MG/1
40 TABLET, DELAYED RELEASE ORAL DAILY
Qty: 90 TABLET | Refills: 0 | Status: SHIPPED | OUTPATIENT
Start: 2023-01-20

## 2023-01-20 RX ORDER — POLYETHYLENE GLYCOL 3350 17 G/17G
POWDER, FOR SOLUTION ORAL
Qty: 238 G | Refills: 2 | Status: SHIPPED | OUTPATIENT
Start: 2023-01-20

## 2023-01-20 RX ORDER — POLYETHYLENE GLYCOL 3350 17 G/17G
17 POWDER, FOR SOLUTION ORAL DAILY
Qty: 289 G | Refills: 2 | Status: SHIPPED | OUTPATIENT
Start: 2023-01-20 | End: 2023-01-20

## 2023-01-20 NOTE — H&P (VIEW-ONLY)
[facility] - Outpatient Consultation  Carlitos Ferro 25 y o  female MRN: [id number]  Encounter: [id number]          ASSESSMENT AND PLAN:      1  Heartburn  2  Epigastric pain  3  Nausea  Patient states she is been having heartburn 1-2 times a week and epigastric pain with intake of greasy foods such as [employer] fries as well as constant feeling of nausea  May be secondary to slow gastric motility, GERD, biliary   -Start pantoprazole 40 mg daily  -Obtain right upper quadrant ultrasound to evaluate for biliary etiology  -EGD scheduled for further evaluation   -     pantoprazole (PROTONIX) 40 mg tablet; Take 1 tablet (40 mg total) by mouth daily  -     EGD; Future; Expected date: 01/20/2023      4  Weight loss  Reports weight loss of 30 pounds over the last year  Has only been eating once a day mostly in the evenings due to frequent nausea and full feeling  Denies any change in activity level  EGD/colonoscopy, CRP, celiac panel ordered  -     Colonoscopy; Future; Expected date: 01/20/2023  -     EGD; Future; Expected date: 01/20/2023  -     CBC and differential; Future  -     Comprehensive metabolic panel; Future  -     TSH, 3rd generation with Free T4 reflex; Future  -     C-reactive protein; Future  -     Celiac Disease Panel; Future  -     CBC and differential  -     Comprehensive metabolic panel  -     TSH, 3rd generation with Free T4 reflex  -     C-reactive protein    5  Lower abdominal pain  6  Constipation, unspecified constipation type  Patient reports change in bowel habit over the last year only moving her bowels once a week with associated lower abdominal pain  Abdominal pain is improved after she is able to move her bowels, but she is still has an " inflamed" feeling in her lower abdomen constantly  Denies any blood per rectum  We will check baseline labs including CBC, CMP, TSH, CRP and colonoscopy ordered for further evaluation  Prep and procedure explained    Advised patient to start MiraLAX daily in the interim and she can titrate this to effect  Differentials include IBS-C, slow transit constipation, IBD, celiac dx  -     Colonoscopy; Future; Expected date: 01/20/2023  -     C-reactive protein; Future  -     C-reactive protein  -     polyethylene glycol (GLYCOLAX) 17 GM/SCOOP powder; Take 17 g by mouth daily  -     Colonoscopy; Future; Expected date: 01/20/2023  -     TSH, 3rd generation with Free T4 reflex; Future  -     TSH, 3rd generation with Free T4 reflex        ______________________________________________________________________    HPI: Alexandre Jeter is a 25 y o  female who presents to [facility] care due to constipation  She denies any issues with constipation as a child but about a year ago noted a change in bowel habit with bowel movements only once a week with associated lower abdominal pain, nausea  When she is able to move her bowels, sometimes it's a large amount other times hard juliann  Bowel movements help to relieve some of the abdominal discomfort but she reports she still feels "inflamed " Denies any rectal bleeding, vomiting  Reports she's lost 30 pounds over the last year (160>130) because she feels nauseated and full, only eats once a day in the evening because that's when she tends to feel better  She also reports heartburn 1-2 times a week for the last year as week and upper abdominal pain with greasy foods such as french fries which lasts about 2-3 hours  Dairy gives worsening constipation/vomiting, mom has diary allergy  Follows a dairy free diet  She's tried Dulcolax, Miralax, suppositories, enemas in the past which caused painful diarrhea but she hasn't tried anything on a regular basis  Increased dietary fiber intake recently but this has not helped  Doesn't exercise on a regular basis now but was a runner in the past  Stays well hydrated  Uses a heating pad which sometimes helps her abdominal pain  Denies any NSAID use        Vapes tobacco on a daily basis  Denies any drug use or alcohol use  Denies any family history of colon cancer, stomach cancer, esophageal cancer  Reports family history of gallstones, kidney stones  Up to date on Gyn exam, denies any urinary issues  REVIEW OF SYSTEMS:    CONSTITUTIONAL: Denies any fever, chills, rigors, and weight loss  HEENT: No earache or tinnitus  CARDIOVASCULAR: No chest pain or palpitations  RESPIRATORY: Denies any cough, hemoptysis, shortness of breath or dyspnea on exertion  GASTROINTESTINAL: As noted in the History of Present Illness  GENITOURINARY: Denies any hematuria or dysuria  NEUROLOGIC: No dizziness or vertigo  MUSCULOSKELETAL: Denies any joint swellings  SKIN: Denies skin rashes or itching  ENDOCRINE: Denies excessive thirst  Denies intolerance to heat or cold  PSYCHOSOCIAL: Denies depression or anxiety  Denies any recent memory loss  Historical Information   Past Medical History:   Diagnosis Date   • Anemia    • Irritable bowel syndrome      History reviewed  No pertinent surgical history    Social History   Social History     Substance and Sexual Activity   Alcohol Use Not Currently    Comment: socailly      Social History     Substance and Sexual Activity   Drug Use Not Currently     Social History     Tobacco Use   Smoking Status Every Day   • Types: Cigars   Smokeless Tobacco Never     Family History   Problem Relation Age of Onset   • No Known Problems Mother    • Depression Father    • Mental illness Father    • Seizures Sister    • No Known Problems Brother    • Diabetes Maternal Grandmother    • No Known Problems Paternal Grandmother    • No Known Problems Paternal Grandfather    • Breast cancer Neg Hx    • Colon cancer Neg Hx    • Ovarian cancer Neg Hx        Meds/Allergies       Current Outpatient Medications:   •  acetaminophen (TYLENOL) 500 mg tablet  •  Ibuprofen 200 MG CAPS  •  pantoprazole (PROTONIX) 40 mg tablet  •  polyethylene glycol (GLYCOLAX) 17 GM/SCOOP powder    No Known Allergies        Objective     Blood pressure 104/84, pulse 99, height 5' 2" (1 575 m), weight 59 4 kg (131 lb)  Body mass index is 23 96 kg/m²  PHYSICAL EXAM:      General Appearance:   Alert, cooperative, no distress   HEENT:   Normocephalic, atraumatic, anicteric  Neck:  Supple, symmetrical, trachea midline   Lungs:   Clear to auscultation bilaterally; no rales, rhonchi or wheezing; respirations unlabored    Heart[de-identified]   Regular rate and rhythm; no murmur  Abdomen:   Soft, lower abdomen R>L tender, non-distended; normal bowel sounds; no masses, no organomegaly    Genitalia:   Deferred    Rectal:   Deferred    Extremities:  No cyanosis, clubbing or edema    Skin:  No jaundice, rashes, or lesions    Lymph nodes:  No palpable cervical lymphadenopathy        Lab Results:   No visits with results within 1 Day(s) from this visit  Latest known visit with results is:   Admission on 05/27/2022, Discharged on 05/27/2022   Component Date Value   • SARS-CoV-2 05/27/2022 Negative    • INFLUENZA A PCR 05/27/2022 Positive (A)    • INFLUENZA B PCR 05/27/2022 Negative    • RSV PCR 05/27/2022 Negative          Radiology Results:   No results found

## 2023-01-20 NOTE — PATIENT INSTRUCTIONS
You have been prescribed miralax (polyethylene glycol) for treatment of your CONSTIPATION  Start 1 capful daily  Take this dose x 3 days  If your symptoms are improved, great! Continue this dose daily  If you have diarrhea (stools are loose, associated with accidents, or urgency) with this dose, cut down to 1/2 capful daily  Continue to titrate down until you find the dose for you  This might be 1 tbsp daily  Wait 3 days before making a change  If you have continued constipation with 1 capful daily, you may need a higher dose  Start with 1 5 capfuls daily and titrate upward  Eg might need 1 capful twice daily  There is no maximum dose, whatever works for you  Again, wait 3 days before making a change  High Fiber Diet   WHAT YOU NEED TO KNOW:   A high-fiber diet includes foods that have a high amount of fiber  Fiber is the part of fruits, vegetables, and grains that is not broken down by your body  Fiber keeps your bowel movements regular  Fiber can also help lower your cholesterol level, control blood sugar in people with diabetes, and relieve constipation  Fiber can also help you control your weight because it helps you feel full faster  Most adults should eat 25 to 35 grams of fiber each day  Talk to your dietitian or healthcare provider about the amount of fiber you need    DISCHARGE INSTRUCTIONS:   Good sources of fiber:       Foods with at least 4 grams of fiber per serving:      ? to ½ cup of high-fiber cereal (check the nutrition label on the box)    ½ cup of blackberries or raspberries    4 dried prunes    1 cooked artichoke    ½ cup of cooked legumes, such as lentils, or red, kidney, and maciel beans    Foods with 1 to 3 grams of fiber per servin slice of whole-wheat, pumpernickel, or rye bread    ½ cup of cooked brown rice    4 whole-wheat crackers    1 cup of oatmeal    ½ cup of cereal with 1 to 3 grams of fiber per serving (check the nutrition label on the box)    1 small piece of fruit, such as an apple, banana, pear, kiwi, or orange    3 dates    ½ cup of canned apricots, fruit cocktail, peaches, or pears    ½ cup of raw or cooked vegetables, such as carrots, cauliflower, cabbage, spinach, squash, or corn    Ways that you can increase fiber in your diet:   Choose brown or wild rice instead of white rice  Use whole wheat flour in recipes instead of white or all-purpose flour  Add beans and peas to casseroles or soups  Choose fresh fruit and vegetables with peels or skins on instead of juices  Other diet guidelines to follow: Add fiber to your diet slowly  You may have abdominal discomfort, bloating, and gas if you add fiber to your diet too quickly  Drink plenty of liquids as you add fiber to your diet  You may have nausea or develop constipation if you do not drink enough water  Ask how much liquid to drink each day and which liquids are best for you  © Copyright [employer] 2022 Information is for End User's use only and may not be sold, redistributed or otherwise used for commercial purposes  All illustrations and images included in CareNotes® are the copyrighted property of A D A M , Inc  or [facility] [employer] [facility] [facility]  The above information is an  only  It is not intended as medical advice for individual conditions or treatments  Talk to your doctor, nurse or pharmacist before following any medical regimen to see if it is safe and effective for you  GERD (Gastroesophageal Reflux Disease)   WHAT YOU NEED TO KNOW:   Gastroesophageal reflux disease (GERD) is reflux that happens more than 2 times a week for a few weeks  Reflux means acid and food in your stomach back up into your esophagus  GERD can cause other health problems over time if it is not treated  DISCHARGE INSTRUCTIONS:   Call your local emergency number (917 in the [address] [place] [address],[address]) if:   You have severe chest pain and sudden trouble breathing        Return to the emergency department if:   You have trouble breathing after you vomit  You have trouble swallowing, or pain with swallowing  Your bowel movements are black, bloody, or tarry-looking  Your vomit looks like coffee grounds or has blood in it  Call your doctor or gastroenterologist if:   You feel full and cannot burp or vomit  You vomit large amounts, or you vomit often  You are losing weight without trying  Your symptoms get worse or do not improve with treatment  You have questions or concerns about your condition or care  Medicines:   Medicines  are used to decrease stomach acid  Medicine may also be used to help your lower esophageal sphincter and stomach contract (tighten) more  Take your medicine as directed  Contact your healthcare provider if you think your medicine is not helping or if you have side effects  Tell him of her if you are allergic to any medicine  Keep a list of the medicines, vitamins, and herbs you take  Include the amounts, and when and why you take them  Bring the list or the pill bottles to follow-up visits  Carry your medicine list with you in case of an emergency  Manage GERD:       Do not have foods or drinks that may increase heartburn  These include chocolate, peppermint, fried or fatty foods, drinks that contain caffeine, or carbonated drinks (soda)  Other foods include spicy foods, onions, tomatoes, and tomato-based foods  Do not have foods or drinks that can irritate your esophagus, such as citrus fruits, juices, and alcohol  Do not eat large meals  When you eat a lot of food at one time, your stomach needs more acid to digest it  Eat 6 small meals each day instead of 3 large ones, and eat slowly  Do not eat meals 2 to 3 hours before bedtime  Elevate the head of your bed  Place 6-inch blocks under the head of your bed frame  You may also use more than one pillow under your head and shoulders while you sleep  Maintain a healthy weight    If you are overweight, weight loss may help relieve symptoms of GERD  Do not smoke  Smoking weakens the lower esophageal sphincter and increases the risk of GERD  Ask your healthcare provider for information if you currently smoke and need help to quit  E-cigarettes or smokeless tobacco still contain nicotine  Talk to your healthcare provider before you use these products  Do not put pressure on your abdomen  Pressure pushes acid up into your esophagus  Do not wear clothing that is tight around your waist  Do not bend over  Bend at the knees if you need to pick something up  Follow up with your doctor or gastroenterologist as directed:  Write down your questions so you remember to ask them during your visits  © [employer] 2022 Information is for End User's use only and may not be sold, redistributed or otherwise used for commercial purposes  All illustrations and images included in CareNotes® are the copyrighted property of [employer]  or Alicia Martinez  The above information is an  only  It is not intended as medical advice for individual conditions or treatments  Talk to your doctor, nurse or pharmacist before following any medical regimen to see if it is safe and effective for you

## 2023-01-20 NOTE — PROGRESS NOTES
[facility] - Outpatient Consultation  Kim Thompson 25 y o  female MRN: [id number]  Encounter: [id number]          ASSESSMENT AND PLAN:      1  Heartburn  2  Epigastric pain  3  Nausea  Patient states she is been having heartburn 1-2 times a week and epigastric pain with intake of greasy foods such as [employer] fries as well as constant feeling of nausea  May be secondary to slow gastric motility, GERD, biliary   -Start pantoprazole 40 mg daily  -Obtain right upper quadrant ultrasound to evaluate for biliary etiology  -EGD scheduled for further evaluation   -     pantoprazole (PROTONIX) 40 mg tablet; Take 1 tablet (40 mg total) by mouth daily  -     EGD; Future; Expected date: 01/20/2023      4  Weight loss  Reports weight loss of 30 pounds over the last year  Has only been eating once a day mostly in the evenings due to frequent nausea and full feeling  Denies any change in activity level  EGD/colonoscopy, CRP, celiac panel ordered  -     Colonoscopy; Future; Expected date: 01/20/2023  -     EGD; Future; Expected date: 01/20/2023  -     CBC and differential; Future  -     Comprehensive metabolic panel; Future  -     TSH, 3rd generation with Free T4 reflex; Future  -     C-reactive protein; Future  -     Celiac Disease Panel; Future  -     CBC and differential  -     Comprehensive metabolic panel  -     TSH, 3rd generation with Free T4 reflex  -     C-reactive protein    5  Lower abdominal pain  6  Constipation, unspecified constipation type  Patient reports change in bowel habit over the last year only moving her bowels once a week with associated lower abdominal pain  Abdominal pain is improved after she is able to move her bowels, but she is still has an " inflamed" feeling in her lower abdomen constantly  Denies any blood per rectum  We will check baseline labs including CBC, CMP, TSH, CRP and colonoscopy ordered for further evaluation  Prep and procedure explained    Advised patient to start MiraLAX daily in the interim and she can titrate this to effect  Differentials include IBS-C, slow transit constipation, IBD, celiac dx  -     Colonoscopy; Future; Expected date: 01/20/2023  -     C-reactive protein; Future  -     C-reactive protein  -     polyethylene glycol (GLYCOLAX) 17 GM/SCOOP powder; Take 17 g by mouth daily  -     Colonoscopy; Future; Expected date: 01/20/2023  -     TSH, 3rd generation with Free T4 reflex; Future  -     TSH, 3rd generation with Free T4 reflex        ______________________________________________________________________    HPI: Lennie Olson is a 25 y o  female who presents to [facility] care due to constipation  She denies any issues with constipation as a child but about a year ago noted a change in bowel habit with bowel movements only once a week with associated lower abdominal pain, nausea  When she is able to move her bowels, sometimes it's a large amount other times hard juliann  Bowel movements help to relieve some of the abdominal discomfort but she reports she still feels "inflamed " Denies any rectal bleeding, vomiting  Reports she's lost 30 pounds over the last year (160>130) because she feels nauseated and full, only eats once a day in the evening because that's when she tends to feel better  She also reports heartburn 1-2 times a week for the last year as week and upper abdominal pain with greasy foods such as french fries which lasts about 2-3 hours  Dairy gives worsening constipation/vomiting, mom has diary allergy  Follows a dairy free diet  She's tried Dulcolax, Miralax, suppositories, enemas in the past which caused painful diarrhea but she hasn't tried anything on a regular basis  Increased dietary fiber intake recently but this has not helped  Doesn't exercise on a regular basis now but was a runner in the past  Stays well hydrated  Uses a heating pad which sometimes helps her abdominal pain  Denies any NSAID use        Vapes tobacco on a daily basis  Denies any drug use or alcohol use  Denies any family history of colon cancer, stomach cancer, esophageal cancer  Reports family history of gallstones, kidney stones  Up to date on Gyn exam, denies any urinary issues  REVIEW OF SYSTEMS:    CONSTITUTIONAL: Denies any fever, chills, rigors, and weight loss  HEENT: No earache or tinnitus  CARDIOVASCULAR: No chest pain or palpitations  RESPIRATORY: Denies any cough, hemoptysis, shortness of breath or dyspnea on exertion  GASTROINTESTINAL: As noted in the History of Present Illness  GENITOURINARY: Denies any hematuria or dysuria  NEUROLOGIC: No dizziness or vertigo  MUSCULOSKELETAL: Denies any joint swellings  SKIN: Denies skin rashes or itching  ENDOCRINE: Denies excessive thirst  Denies intolerance to heat or cold  PSYCHOSOCIAL: Denies depression or anxiety  Denies any recent memory loss  Historical Information   Past Medical History:   Diagnosis Date   • Anemia    • Irritable bowel syndrome      History reviewed  No pertinent surgical history    Social History   Social History     Substance and Sexual Activity   Alcohol Use Not Currently    Comment: socailly      Social History     Substance and Sexual Activity   Drug Use Not Currently     Social History     Tobacco Use   Smoking Status Every Day   • Types: Cigars   Smokeless Tobacco Never     Family History   Problem Relation Age of Onset   • No Known Problems Mother    • Depression Father    • Mental illness Father    • Seizures Sister    • No Known Problems Brother    • Diabetes Maternal Grandmother    • No Known Problems Paternal Grandmother    • No Known Problems Paternal Grandfather    • Breast cancer Neg Hx    • Colon cancer Neg Hx    • Ovarian cancer Neg Hx        Meds/Allergies       Current Outpatient Medications:   •  acetaminophen (TYLENOL) 500 mg tablet  •  Ibuprofen 200 MG CAPS  •  pantoprazole (PROTONIX) 40 mg tablet  •  polyethylene glycol (GLYCOLAX) 17 GM/SCOOP powder    No Known Allergies        Objective     Blood pressure 104/84, pulse 99, height 5' 2" (1 575 m), weight 59 4 kg (131 lb)  Body mass index is 23 96 kg/m²  PHYSICAL EXAM:      General Appearance:   Alert, cooperative, no distress   HEENT:   Normocephalic, atraumatic, anicteric  Neck:  Supple, symmetrical, trachea midline   Lungs:   Clear to auscultation bilaterally; no rales, rhonchi or wheezing; respirations unlabored    Heart[de-identified]   Regular rate and rhythm; no murmur  Abdomen:   Soft, lower abdomen R>L tender, non-distended; normal bowel sounds; no masses, no organomegaly    Genitalia:   Deferred    Rectal:   Deferred    Extremities:  No cyanosis, clubbing or edema    Skin:  No jaundice, rashes, or lesions    Lymph nodes:  No palpable cervical lymphadenopathy        Lab Results:   No visits with results within 1 Day(s) from this visit  Latest known visit with results is:   Admission on 05/27/2022, Discharged on 05/27/2022   Component Date Value   • SARS-CoV-2 05/27/2022 Negative    • INFLUENZA A PCR 05/27/2022 Positive (A)    • INFLUENZA B PCR 05/27/2022 Negative    • RSV PCR 05/27/2022 Negative          Radiology Results:   No results found

## 2023-01-27 ENCOUNTER — APPOINTMENT (OUTPATIENT)
Dept: LAB | Facility: CLINIC | Age: 23
End: 2023-01-27

## 2023-01-27 DIAGNOSIS — R63.4 WEIGHT LOSS: ICD-10-CM

## 2023-01-27 DIAGNOSIS — Z20.2 POSSIBLE EXPOSURE TO STD: ICD-10-CM

## 2023-01-27 LAB
ALBUMIN SERPL BCP-MCNC: 3.8 G/DL (ref 3.5–5)
ALP SERPL-CCNC: 59 U/L (ref 46–116)
ALT SERPL W P-5'-P-CCNC: 19 U/L (ref 12–78)
ANION GAP SERPL CALCULATED.3IONS-SCNC: 4 MMOL/L (ref 4–13)
AST SERPL W P-5'-P-CCNC: 18 U/L (ref 5–45)
BASOPHILS # BLD AUTO: 0.03 THOUSANDS/ÂΜL (ref 0–0.1)
BASOPHILS NFR BLD AUTO: 1 % (ref 0–1)
BILIRUB SERPL-MCNC: 0.79 MG/DL (ref 0.2–1)
BUN SERPL-MCNC: 6 MG/DL (ref 5–25)
CALCIUM SERPL-MCNC: 8.8 MG/DL (ref 8.3–10.1)
CHLORIDE SERPL-SCNC: 110 MMOL/L (ref 96–108)
CO2 SERPL-SCNC: 27 MMOL/L (ref 21–32)
CREAT SERPL-MCNC: 0.74 MG/DL (ref 0.6–1.3)
CRP SERPL QL: <3 MG/L
EOSINOPHIL # BLD AUTO: 0.06 THOUSAND/ÂΜL (ref 0–0.61)
EOSINOPHIL NFR BLD AUTO: 1 % (ref 0–6)
ERYTHROCYTE [DISTWIDTH] IN BLOOD BY AUTOMATED COUNT: 15.4 % (ref 11.6–15.1)
GFR SERPL CREATININE-BSD FRML MDRD: 115 ML/MIN/1.73SQ M
GLUCOSE P FAST SERPL-MCNC: 91 MG/DL (ref 65–99)
HBV SURFACE AG SER QL: NORMAL
HCT VFR BLD AUTO: 31.8 % (ref 34.8–46.1)
HCV AB SER QL: NORMAL
HGB BLD-MCNC: 9.9 G/DL (ref 11.5–15.4)
IMM GRANULOCYTES # BLD AUTO: 0.01 THOUSAND/UL (ref 0–0.2)
IMM GRANULOCYTES NFR BLD AUTO: 0 % (ref 0–2)
LYMPHOCYTES # BLD AUTO: 1.34 THOUSANDS/ÂΜL (ref 0.6–4.47)
LYMPHOCYTES NFR BLD AUTO: 30 % (ref 14–44)
MCH RBC QN AUTO: 24.1 PG (ref 26.8–34.3)
MCHC RBC AUTO-ENTMCNC: 31.1 G/DL (ref 31.4–37.4)
MCV RBC AUTO: 78 FL (ref 82–98)
MONOCYTES # BLD AUTO: 0.42 THOUSAND/ÂΜL (ref 0.17–1.22)
MONOCYTES NFR BLD AUTO: 9 % (ref 4–12)
NEUTROPHILS # BLD AUTO: 2.6 THOUSANDS/ÂΜL (ref 1.85–7.62)
NEUTS SEG NFR BLD AUTO: 59 % (ref 43–75)
NRBC BLD AUTO-RTO: 0 /100 WBCS
PLATELET # BLD AUTO: 440 THOUSANDS/UL (ref 149–390)
PMV BLD AUTO: 10.4 FL (ref 8.9–12.7)
POTASSIUM SERPL-SCNC: 3.4 MMOL/L (ref 3.5–5.3)
PROT SERPL-MCNC: 7.6 G/DL (ref 6.4–8.4)
RBC # BLD AUTO: 4.1 MILLION/UL (ref 3.81–5.12)
SODIUM SERPL-SCNC: 141 MMOL/L (ref 135–147)
TSH SERPL DL<=0.05 MIU/L-ACNC: 1.78 UIU/ML (ref 0.45–4.5)
WBC # BLD AUTO: 4.46 THOUSAND/UL (ref 4.31–10.16)

## 2023-01-28 LAB
ENDOMYSIUM IGA SER QL: NEGATIVE
GLIADIN PEPTIDE IGA SER-ACNC: 3 UNITS (ref 0–19)
GLIADIN PEPTIDE IGG SER-ACNC: 6 UNITS (ref 0–19)
HIV 1+2 AB+HIV1 P24 AG SERPL QL IA: NORMAL
HIV 2 AB SERPL QL IA: NORMAL
HIV1 AB SERPL QL IA: NORMAL
HIV1 P24 AG SERPL QL IA: NORMAL
IGA SERPL-MCNC: 203 MG/DL (ref 87–352)
RPR SER QL: NORMAL
TTG IGA SER-ACNC: <2 U/ML (ref 0–3)
TTG IGG SER-ACNC: 3 U/ML (ref 0–5)

## 2023-02-02 ENCOUNTER — ANESTHESIA (OUTPATIENT)
Dept: GASTROENTEROLOGY | Facility: AMBULATORY SURGERY CENTER | Age: 23
End: 2023-02-02

## 2023-02-02 ENCOUNTER — HOSPITAL ENCOUNTER (OUTPATIENT)
Dept: GASTROENTEROLOGY | Facility: AMBULATORY SURGERY CENTER | Age: 23
Discharge: HOME | End: 2023-02-02

## 2023-02-02 ENCOUNTER — ANESTHESIA EVENT (OUTPATIENT)
Dept: GASTROENTEROLOGY | Facility: AMBULATORY SURGERY CENTER | Age: 23
End: 2023-02-02

## 2023-02-02 VITALS
DIASTOLIC BLOOD PRESSURE: 72 MMHG | HEIGHT: 62 IN | SYSTOLIC BLOOD PRESSURE: 110 MMHG | BODY MASS INDEX: 24.29 KG/M2 | TEMPERATURE: 96.4 F | OXYGEN SATURATION: 100 % | RESPIRATION RATE: 18 BRPM | HEART RATE: 80 BPM | WEIGHT: 132 LBS

## 2023-02-02 DIAGNOSIS — R10.30 LOWER ABDOMINAL PAIN: ICD-10-CM

## 2023-02-02 DIAGNOSIS — K59.00 CONSTIPATION, UNSPECIFIED CONSTIPATION TYPE: ICD-10-CM

## 2023-02-02 DIAGNOSIS — R11.0 NAUSEA: ICD-10-CM

## 2023-02-02 DIAGNOSIS — R10.13 EPIGASTRIC PAIN: ICD-10-CM

## 2023-02-02 DIAGNOSIS — R63.4 WEIGHT LOSS: ICD-10-CM

## 2023-02-02 DIAGNOSIS — R12 HEARTBURN: ICD-10-CM

## 2023-02-02 LAB
EXT PREGNANCY TEST URINE: NEGATIVE
EXT. CONTROL: NORMAL

## 2023-02-02 RX ORDER — PROPOFOL 10 MG/ML
INJECTION, EMULSION INTRAVENOUS AS NEEDED
Status: DISCONTINUED | OUTPATIENT
Start: 2023-02-02 | End: 2023-02-02

## 2023-02-02 RX ORDER — SODIUM CHLORIDE, SODIUM LACTATE, POTASSIUM CHLORIDE, CALCIUM CHLORIDE 600; 310; 30; 20 MG/100ML; MG/100ML; MG/100ML; MG/100ML
INJECTION, SOLUTION INTRAVENOUS CONTINUOUS PRN
Status: DISCONTINUED | OUTPATIENT
Start: 2023-02-02 | End: 2023-02-02

## 2023-02-02 RX ADMIN — PROPOFOL 50 MG: 10 INJECTION, EMULSION INTRAVENOUS at 11:11

## 2023-02-02 RX ADMIN — PROPOFOL 100 MG: 10 INJECTION, EMULSION INTRAVENOUS at 11:07

## 2023-02-02 RX ADMIN — SODIUM CHLORIDE, SODIUM LACTATE, POTASSIUM CHLORIDE, CALCIUM CHLORIDE: 600; 310; 30; 20 INJECTION, SOLUTION INTRAVENOUS at 11:22

## 2023-02-02 RX ADMIN — PROPOFOL 50 MG: 10 INJECTION, EMULSION INTRAVENOUS at 11:13

## 2023-02-02 RX ADMIN — PROPOFOL 50 MG: 10 INJECTION, EMULSION INTRAVENOUS at 11:05

## 2023-02-02 RX ADMIN — PROPOFOL 100 MG: 10 INJECTION, EMULSION INTRAVENOUS at 11:18

## 2023-02-02 RX ADMIN — SODIUM CHLORIDE, SODIUM LACTATE, POTASSIUM CHLORIDE, CALCIUM CHLORIDE: 600; 310; 30; 20 INJECTION, SOLUTION INTRAVENOUS at 10:15

## 2023-02-02 RX ADMIN — PROPOFOL 150 MG: 10 INJECTION, EMULSION INTRAVENOUS at 11:04

## 2023-02-02 RX ADMIN — PROPOFOL 100 MG: 10 INJECTION, EMULSION INTRAVENOUS at 11:15

## 2023-02-02 RX ADMIN — PROPOFOL 100 MG: 10 INJECTION, EMULSION INTRAVENOUS at 11:02

## 2023-02-02 NOTE — ANESTHESIA PREPROCEDURE EVALUATION
Procedure:  COLONOSCOPY  EGD    Relevant Problems   ANESTHESIA (within normal limits)      CARDIO   (-) HTN (hypertension)   (-) Hyperlipidemia      ENDO   (-) Diabetes mellitus, type 2 (HCC)   (-) Hyperthyroidism   (-) Hypothyroidism      GI/HEPATIC   (-) Gastroesophageal reflux disease      /RENAL (within normal limits)      HEMATOLOGY   (+) Anemia      MUSCULOSKELETAL (within normal limits)      NEURO/PSYCH (within normal limits)      PULMONARY   (+) Asthma   (-) Sleep apnea      Digestive   (+) Irritable bowel syndrome        Physical Exam    Airway    Mallampati score: III  TM Distance: >3 FB  Neck ROM: full     Dental   No notable dental hx     Cardiovascular      Pulmonary      Other Findings        Anesthesia Plan  ASA Score- 2     Anesthesia Type- IV sedation with anesthesia with ASA Monitors  Additional Monitors:   Airway Plan:           Plan Factors-Exercise tolerance (METS): >4 METS  Chart reviewed  Existing labs reviewed  Patient summary reviewed  Patient is a current smoker  Induction- intravenous  Postoperative Plan-     Informed Consent- Anesthetic plan and risks discussed with patient  I personally reviewed this patient with the CRNA  Discussed and agreed on the Anesthesia Plan with the CRNA  Antony Péerz

## 2023-02-02 NOTE — ANESTHESIA POSTPROCEDURE EVALUATION
Post-Op Assessment Note    CV Status:  Stable  Pain Score: 0    Pain management: adequate     Mental Status:  Alert and awake   Hydration Status:  Euvolemic   PONV Controlled:  Controlled   Airway Patency:  Patent      Post Op Vitals Reviewed: Yes      Staff: CRNA         No notable events documented      BP   93/52   Temp  98   Pulse  87   Resp   16   SpO2   98

## 2023-02-02 NOTE — INTERVAL H&P NOTE
H&P reviewed  After examining the patient I find no changes in the patients condition since the H&P had been written      Vitals:    02/02/23 1036   BP: 129/76   Pulse: 88   Resp: 18   Temp: (!) 96 4 °F (35 8 °C)   SpO2: 100%

## 2023-08-29 ENCOUNTER — HOSPITAL ENCOUNTER (EMERGENCY)
Facility: HOSPITAL | Age: 23
Discharge: HOME | End: 2023-08-29
Attending: EMERGENCY MEDICINE | Admitting: EMERGENCY MEDICINE
Payer: COMMERCIAL

## 2023-08-29 VITALS
RESPIRATION RATE: 16 BRPM | TEMPERATURE: 98.3 F | HEART RATE: 80 BPM | HEIGHT: 62 IN | OXYGEN SATURATION: 100 % | SYSTOLIC BLOOD PRESSURE: 107 MMHG | WEIGHT: 134 LBS | BODY MASS INDEX: 24.66 KG/M2 | DIASTOLIC BLOOD PRESSURE: 76 MMHG

## 2023-08-29 DIAGNOSIS — Z01.89 ENCOUNTER FOR LABORATORY TEST: Primary | ICD-10-CM

## 2023-08-29 PROCEDURE — 99282 EMERGENCY DEPT VISIT SF MDM: CPT

## 2023-08-29 PROCEDURE — 86480 TB TEST CELL IMMUN MEASURE: CPT | Performed by: PHYSICIAN ASSISTANT

## 2023-08-29 PROCEDURE — 99284 EMERGENCY DEPT VISIT MOD MDM: CPT | Performed by: PHYSICIAN ASSISTANT

## 2023-08-29 PROCEDURE — 36415 COLL VENOUS BLD VENIPUNCTURE: CPT | Performed by: PHYSICIAN ASSISTANT

## 2023-08-29 NOTE — ED PROVIDER NOTES
History  Chief Complaint   Patient presents with   • Labs Only     Pt presents to ed via walk in for a quantiferon blood test for a pre employment testing      This is a 70-year-old female with no significant past medical history presenting to the emergency department today for laboratory testing. The patient notes that she requires tuberculosis testing for a new job that she obtained. She came here to the emergency department for QuantiFERON gold testing. She denies any chest pain or shortness of breath. She denies cough. Has never been diagnosed with tuberculosis in the past.  No other complaints at this time. History provided by:  Patient   used: No    Medical Problem  Associated symptoms: no abdominal pain, no chest pain, no congestion, no cough, no diarrhea, no ear pain, no fatigue, no fever, no headaches, no loss of consciousness, no myalgias, no nausea, no rash, no rhinorrhea, no shortness of breath, no sore throat, no vomiting and no wheezing        Prior to Admission Medications   Prescriptions Last Dose Informant Patient Reported? Taking?    pantoprazole (PROTONIX) 40 mg tablet 8/28/2023  No Yes   Sig: TAKE 1 TABLET BY MOUTH EVERY DAY      Facility-Administered Medications: None       Past Medical History:   Diagnosis Date   • Anemia    • Anxiety    • Constipation    • Depression    • GERD (gastroesophageal reflux disease)    • Irritable bowel syndrome    • Lower abdominal pain    • Nausea    • Weight loss        Past Surgical History:   Procedure Laterality Date   • NO PAST SURGERIES         Family History   Problem Relation Age of Onset   • No Known Problems Mother    • Depression Father    • Mental illness Father    • Seizures Sister    • No Known Problems Brother    • Colon cancer Maternal Grandmother    • Diabetes Maternal Grandmother    • No Known Problems Paternal Grandmother    • No Known Problems Paternal Grandfather    • Breast cancer Neg Hx    • Ovarian cancer Neg Hx I have reviewed and agree with the history as documented. E-Cigarette/Vaping   • E-Cigarette Use Current Every Day User    • Cartridges/Day 1      E-Cigarette/Vaping Substances   • Nicotine Yes    • THC No    • CBD No    • Flavoring Yes    • Other No    • Unknown No      Social History     Tobacco Use   • Smoking status: Former     Types: Cigars     Quit date:      Years since quittin.6   • Smokeless tobacco: Never   Vaping Use   • Vaping Use: Every day   • Substances: Nicotine, Flavoring   Substance Use Topics   • Alcohol use: Not Currently     Comment: socailly    • Drug use: Not Currently       Review of Systems   Constitutional: Negative for appetite change, chills, diaphoresis, fatigue and fever. HENT: Negative for congestion, ear pain, rhinorrhea and sore throat. Eyes: Negative for visual disturbance. Respiratory: Negative for cough, chest tightness, shortness of breath and wheezing. Cardiovascular: Negative for chest pain, palpitations and leg swelling. Gastrointestinal: Negative for abdominal pain, constipation, diarrhea, nausea and vomiting. Musculoskeletal: Negative for myalgias, neck pain and neck stiffness. Skin: Negative for rash and wound. Neurological: Negative for dizziness, seizures, loss of consciousness, syncope, weakness, light-headedness, numbness and headaches. Psychiatric/Behavioral: Negative for confusion. All other systems reviewed and are negative. Physical Exam  Physical Exam  Vitals and nursing note reviewed. Constitutional:       General: She is not in acute distress. Appearance: Normal appearance. She is normal weight. She is not ill-appearing, toxic-appearing or diaphoretic. HENT:      Head: Normocephalic and atraumatic. Nose: Nose normal. No congestion or rhinorrhea. Mouth/Throat:      Mouth: Mucous membranes are moist.      Pharynx: No oropharyngeal exudate or posterior oropharyngeal erythema.    Eyes:      General: No scleral icterus. Right eye: No discharge. Left eye: No discharge. Extraocular Movements: Extraocular movements intact. Pupils: Pupils are equal, round, and reactive to light. Cardiovascular:      Rate and Rhythm: Normal rate and regular rhythm. Pulses: Normal pulses. Heart sounds: Normal heart sounds. No murmur heard. No friction rub. No gallop. Pulmonary:      Effort: Pulmonary effort is normal. No respiratory distress. Breath sounds: Normal breath sounds. No stridor. No wheezing, rhonchi or rales. Chest:      Chest wall: No tenderness. Musculoskeletal:         General: Normal range of motion. Cervical back: Normal range of motion. No tenderness. Right lower leg: No edema. Left lower leg: No edema. Skin:     General: Skin is warm and dry. Capillary Refill: Capillary refill takes less than 2 seconds. Coloration: Skin is not jaundiced or pale. Neurological:      General: No focal deficit present. Mental Status: She is alert and oriented to person, place, and time. Mental status is at baseline. Psychiatric:         Mood and Affect: Mood normal.         Behavior: Behavior normal.         Vital Signs  ED Triage Vitals [08/29/23 1219]   Temperature Pulse Respirations Blood Pressure SpO2   98.3 °F (36.8 °C) 80 16 107/76 100 %      Temp Source Heart Rate Source Patient Position - Orthostatic VS BP Location FiO2 (%)   Oral Monitor Sitting Left arm --      Pain Score       No Pain           Vitals:    08/29/23 1219   BP: 107/76   Pulse: 80   Patient Position - Orthostatic VS: Sitting         Visual Acuity      ED Medications  Medications - No data to display    Diagnostic Studies  Results Reviewed     Procedure Component Value Units Date/Time    Quantiferon TB Gold Plus [[id number]] Collected: 08/29/23 1243    Lab Status:  In process Specimen: Blood from Arm, Right Updated: 08/29/23 1249                 No orders to display Procedures  Procedures         ED Course                               SBIRT 22yo+    Flowsheet Row Most Recent Value   Initial Alcohol Screen: US AUDIT-C     1. How often do you have a drink containing alcohol? 0 Filed at: 08/29/2023 1221   2. How many drinks containing alcohol do you have on a typical day you are drinking? 0 Filed at: 08/29/2023 1221   3a. Male UNDER 65: How often do you have five or more drinks on one occasion? 0 Filed at: 08/29/2023 1221   3b. FEMALE Any Age, or MALE 65+: How often do you have 4 or more drinks on one occassion? 0 Filed at: 08/29/2023 1221   Audit-C Score 0 Filed at: 08/29/2023 1221   JESSENIA: How many times in the past year have you. .. Used an illegal drug or used a prescription medication for non-medical reasons? Never Filed at: 08/29/2023 1221                    Medical Decision Making  This is a 80-year-old female presenting to the emergency department today for QuantiFERON gold testing. She denies complaints. Vitals are stable. Physical examination is reassuring. Testing performed while here in the emergency department. The patient is stable for discharge at this time. Strict return precautions were given. Recommend PCP follow-up as soon as possible. The patient and/or patient's proxy verify their understanding and agree to the plan at this time. All questions answered to the patient and/or their proxy's satisfaction. All labs reviewed and utilized in the medical decision making process (if labs were ordered). Portions of the record may have been created with voice recognition software.  Occasional wrong word or "sound a like" substitutions may have occurred due to the inherent limitations of voice recognition software.  Read the chart carefully and recognize, using context, where substitutions have occurred. Encounter for laboratory test: complicated acute illness or injury  Amount and/or Complexity of Data Reviewed  External Data Reviewed: notes.   Labs: ordered. Decision-making details documented in ED Course. Disposition  Final diagnoses:   Encounter for laboratory test     Time reflects when diagnosis was documented in both MDM as applicable and the Disposition within this note     Time User Action Codes Description Comment    8/29/2023 12:25 PM Rani, [address] [Z01.89] Encounter for laboratory test       ED Disposition     ED Disposition   Discharge    Condition   Stable    Date/Time   Tue Aug 29, 2023 [address] discharge to home/self care. Follow-up Information     Follow up With Specialties Details Why Contact Info Additional [address] [facility],[address] Emergency Department Emergency Medicine Go to  If symptoms worsen [address] [place] [id number]  [address] [facility] Emergency Department, [address] [place] [address], [address] [place] [address] Schedule an appointment as soon as possible for a visit   [address] [facility] [id number]  [phone] RN YVVUP MANDEEP HAQUO NQWQHM, [address] [place] [address], [id number] [phone]          Discharge Medication List as of 8/29/2023 12:26 PM      CONTINUE these medications which have NOT CHANGED    Details   pantoprazole (PROTONIX) 40 mg tablet TAKE 1 TABLET BY MOUTH EVERY DAY, Normal             No discharge procedures on file.     PDMP Review     None          ED Provider  Electronically Signed by           Modesto Landeros PA-C  08/29/23 7741

## 2023-08-29 NOTE — DISCHARGE INSTRUCTIONS
Please return to the emergency department for worsening symptoms including chest pain, shortness of breath, dizziness, lightheadedness, fever greater than 103, severe pain, inability to walk, fainting episodes, etc.. Please follow-up with your family practice provider as soon as possible.

## 2023-08-31 LAB
GAMMA INTERFERON BACKGROUND BLD IA-ACNC: 0.01 IU/ML
M TB IFN-G BLD-IMP: NEGATIVE
M TB IFN-G CD4+ BCKGRND COR BLD-ACNC: 0.01 IU/ML
M TB IFN-G CD4+ BCKGRND COR BLD-ACNC: 0.04 IU/ML
MITOGEN IGNF BCKGRD COR BLD-ACNC: >10 IU/ML

## 2023-09-21 ENCOUNTER — HOSPITAL ENCOUNTER (EMERGENCY)
Facility: HOSPITAL | Age: 23
Discharge: HOME | End: 2023-09-21
Attending: EMERGENCY MEDICINE
Payer: COMMERCIAL

## 2023-09-21 VITALS
HEART RATE: 95 BPM | TEMPERATURE: 98.2 F | HEIGHT: 62 IN | BODY MASS INDEX: 24.48 KG/M2 | SYSTOLIC BLOOD PRESSURE: 117 MMHG | WEIGHT: 133 LBS | OXYGEN SATURATION: 100 % | DIASTOLIC BLOOD PRESSURE: 73 MMHG | RESPIRATION RATE: 18 BRPM

## 2023-09-21 DIAGNOSIS — Z13.9 ENCOUNTER FOR MEDICAL SCREENING EXAMINATION: Primary | ICD-10-CM

## 2023-09-21 PROCEDURE — 99284 EMERGENCY DEPT VISIT MOD MDM: CPT

## 2023-09-21 PROCEDURE — 99282 EMERGENCY DEPT VISIT SF MDM: CPT | Performed by: EMERGENCY MEDICINE

## 2023-09-21 NOTE — Clinical Note
Kathy Andujar was seen and treated in our emergency department on 9/21/2023. Diagnosis:     Carmen Mendes  may return to work on return date. She may return on this date: 09/21/2023         If you have any questions or concerns, please don't hesitate to call.       Javi Joyner MD    ______________________________           _______________          _______________  Hospital Representative                              Date                                Time

## 2023-09-21 NOTE — ED PROVIDER NOTES
History  Chief Complaint   Patient presents with   • Chest Pain     Pt reports she has been having intermittent CP for approx 1 week. States she was sent home from work yesterday. She used her inhaler and took a nap and felt better, but she is unable to return to work until she obtains a doctor's note. 25-year-old female with a history of asthma and GERD presents seeking a work note. Patient states that she has had some chest tightness over the week and was sent home from work yesterday. She used her inhaler and took a nap and then was feeling improved. She is feeling well today but states that work will not let her return without a work note. None       Past Medical History:   Diagnosis Date   • Anemia    • Anxiety    • Constipation    • Depression    • GERD (gastroesophageal reflux disease)    • Irritable bowel syndrome    • Lower abdominal pain    • Nausea    • Weight loss        Past Surgical History:   Procedure Laterality Date   • NO PAST SURGERIES         Family History   Problem Relation Age of Onset   • No Known Problems Mother    • Depression Father    • Mental illness Father    • Seizures Sister    • No Known Problems Brother    • Colon cancer Maternal Grandmother    • Diabetes Maternal Grandmother    • No Known Problems Paternal Grandmother    • No Known Problems Paternal Grandfather    • Breast cancer Neg Hx    • Ovarian cancer Neg Hx      I have reviewed and agree with the history as documented.     E-Cigarette/Vaping   • E-Cigarette Use Current Every Day User    • Cartridges/Day 1      E-Cigarette/Vaping Substances   • Nicotine Yes    • THC No    • CBD No    • Flavoring Yes    • Other No    • Unknown No      Social History     Tobacco Use   • Smoking status: Former     Types: Cigars     Quit date:      Years since quittin.7   • Smokeless tobacco: Never   Vaping Use   • Vaping Use: Every day   • Substances: Nicotine, Flavoring   Substance Use Topics   • Alcohol use: Not Currently     Comment: socailly    • Drug use: Not Currently       Review of Systems   All other systems reviewed and are negative. Physical Exam  Physical Exam  Constitutional:       General: She is not in acute distress. Appearance: Normal appearance. She is well-developed. She is not ill-appearing or diaphoretic. HENT:      Nose: Nose normal.      Mouth/Throat:      Mouth: Mucous membranes are moist.      Pharynx: Oropharynx is clear. Eyes:      Conjunctiva/sclera: Conjunctivae normal.   Cardiovascular:      Rate and Rhythm: Normal rate and regular rhythm. Heart sounds: Normal heart sounds. Pulmonary:      Effort: Pulmonary effort is normal.      Breath sounds: Normal breath sounds. Skin:     General: Skin is warm and dry. Neurological:      General: No focal deficit present. Mental Status: She is alert and oriented to person, place, and time. Psychiatric:         Mood and Affect: Mood normal.         Behavior: Behavior normal.         Vital Signs  ED Triage Vitals   Temperature Pulse Respirations Blood Pressure SpO2   09/21/23 0921 09/21/23 0917 09/21/23 0917 09/21/23 0917 09/21/23 0917   98.2 °F (36.8 °C) 95 18 117/73 100 %      Temp Source Heart Rate Source Patient Position - Orthostatic VS BP Location FiO2 (%)   09/21/23 0921 09/21/23 0917 09/21/23 0917 09/21/23 0917 --   Oral Monitor Sitting Left arm       Pain Score       09/21/23 0917       No Pain           Vitals:    09/21/23 0917   BP: 117/73   Pulse: 95   Patient Position - Orthostatic VS: Sitting         Visual Acuity      ED Medications  Medications - No data to display    Diagnostic Studies  Results Reviewed     None                 No orders to display              Procedures  Procedures         ED Course       Patient presenting seeking work note. She had some chest tightness yesterday that she attributes to asthma which improved with albuterol. Today she is asymptomatic.   She does not wish to have any further medical work-up at this time. She has normal vitals and is well-appearing. Her lungs are clear and she has normal heart exam.  Stable for discharge. Advised PCP follow-up. SBIRT 20yo+    Flowsheet Row Most Recent Value   Initial Alcohol Screen: US AUDIT-C     1. How often do you have a drink containing alcohol? 0 Filed at: 09/21/2023 0920   Audit-C Score 0 Filed at: 09/21/2023 5428   JESSENIA: How many times in the past year have you. .. Used an illegal drug or used a prescription medication for non-medical reasons? Never Filed at: 09/21/2023 0920                    MDM    Disposition  Final diagnoses:   Encounter for medical screening examination     Time reflects when diagnosis was documented in both MDM as applicable and the Disposition within this note     Time User Action Codes Description Comment    9/21/2023  9:45 AM Charity Biggs Add [Z13.9] Encounter for medical screening examination       ED Disposition     ED Disposition   Discharge    Condition   Stable    Date/Time   Thu Sep 21, 2023 120 [facility] discharge to home/self care. Follow-up Information    None         There are no discharge medications for this patient. No discharge procedures on file.     PDMP Review     None          ED Provider  Electronically Signed by           Salvador Heller MD  09/21/23 8110

## 2024-02-21 PROBLEM — Z01.419 ENCOUNTER FOR GYNECOLOGICAL EXAMINATION WITHOUT ABNORMAL FINDING: Status: RESOLVED | Noted: 2022-03-01 | Resolved: 2024-02-21

## 2024-04-03 ENCOUNTER — HOSPITAL ENCOUNTER (EMERGENCY)
Facility: HOSPITAL | Age: 24
Discharge: HOME | End: 2024-04-03
Attending: INTERNAL MEDICINE
Payer: COMMERCIAL

## 2024-04-03 VITALS
OXYGEN SATURATION: 100 % | DIASTOLIC BLOOD PRESSURE: 60 MMHG | RESPIRATION RATE: 19 BRPM | TEMPERATURE: 98.7 F | SYSTOLIC BLOOD PRESSURE: 123 MMHG | HEART RATE: 71 BPM

## 2024-04-03 DIAGNOSIS — R07.9 CHEST PAIN, UNSPECIFIED TYPE: Primary | ICD-10-CM

## 2024-04-03 LAB
ANION GAP SERPL CALCULATED.3IONS-SCNC: 6 MMOL/L (ref 4–13)
ATRIAL RATE: 77 BPM
BASOPHILS # BLD AUTO: 0.02 THOUSANDS/ÂΜL (ref 0–0.1)
BASOPHILS NFR BLD AUTO: 0 % (ref 0–1)
BUN SERPL-MCNC: 8 MG/DL (ref 5–25)
CALCIUM SERPL-MCNC: 8.9 MG/DL (ref 8.4–10.2)
CARDIAC TROPONIN I PNL SERPL HS: <2 NG/L (ref 8–18)
CHLORIDE SERPL-SCNC: 106 MMOL/L (ref 96–108)
CO2 SERPL-SCNC: 27 MMOL/L (ref 21–32)
CREAT SERPL-MCNC: 0.68 MG/DL (ref 0.6–1.3)
EOSINOPHIL # BLD AUTO: 0.04 THOUSAND/ÂΜL (ref 0–0.61)
EOSINOPHIL NFR BLD AUTO: 1 % (ref 0–6)
ERYTHROCYTE [DISTWIDTH] IN BLOOD BY AUTOMATED COUNT: 15.9 % (ref 11.6–15.1)
GFR SERPL CREATININE-BSD FRML MDRD: 123 ML/MIN/1.73SQ M
GLUCOSE SERPL-MCNC: 92 MG/DL (ref 65–140)
HCT VFR BLD AUTO: 30.2 % (ref 34.8–46.1)
HGB BLD-MCNC: 9.7 G/DL (ref 11.5–15.4)
IMM GRANULOCYTES # BLD AUTO: 0.01 THOUSAND/UL (ref 0–0.2)
IMM GRANULOCYTES NFR BLD AUTO: 0 % (ref 0–2)
LYMPHOCYTES # BLD AUTO: 1.67 THOUSANDS/ÂΜL (ref 0.6–4.47)
LYMPHOCYTES NFR BLD AUTO: 31 % (ref 14–44)
MCH RBC QN AUTO: 23.7 PG (ref 26.8–34.3)
MCHC RBC AUTO-ENTMCNC: 32.1 G/DL (ref 31.4–37.4)
MCV RBC AUTO: 74 FL (ref 82–98)
MONOCYTES # BLD AUTO: 0.42 THOUSAND/ÂΜL (ref 0.17–1.22)
MONOCYTES NFR BLD AUTO: 8 % (ref 4–12)
NEUTROPHILS # BLD AUTO: 3.3 THOUSANDS/ÂΜL (ref 1.85–7.62)
NEUTS SEG NFR BLD AUTO: 60 % (ref 43–75)
NRBC BLD AUTO-RTO: 0 /100 WBCS
P AXIS: 71 DEGREES
PLATELET # BLD AUTO: 468 THOUSANDS/UL (ref 149–390)
PMV BLD AUTO: 9.9 FL (ref 8.9–12.7)
POTASSIUM SERPL-SCNC: 3.7 MMOL/L (ref 3.5–5.3)
PR INTERVAL: 142 MS
QRS AXIS: 74 DEGREES
QRSD INTERVAL: 78 MS
QT INTERVAL: 390 MS
QTC INTERVAL: 441 MS
RBC # BLD AUTO: 4.1 MILLION/UL (ref 3.81–5.12)
SODIUM SERPL-SCNC: 139 MMOL/L (ref 135–147)
T WAVE AXIS: 46 DEGREES
VENTRICULAR RATE: 77 BPM
WBC # BLD AUTO: 5.46 THOUSAND/UL (ref 4.31–10.16)

## 2024-04-03 PROCEDURE — 85025 COMPLETE CBC W/AUTO DIFF WBC: CPT | Performed by: INTERNAL MEDICINE

## 2024-04-03 PROCEDURE — 80048 BASIC METABOLIC PNL TOTAL CA: CPT | Performed by: INTERNAL MEDICINE

## 2024-04-03 PROCEDURE — 93005 ELECTROCARDIOGRAM TRACING: CPT

## 2024-04-03 PROCEDURE — 99285 EMERGENCY DEPT VISIT HI MDM: CPT

## 2024-04-03 PROCEDURE — 93010 ELECTROCARDIOGRAM REPORT: CPT | Performed by: INTERNAL MEDICINE

## 2024-04-03 PROCEDURE — 99285 EMERGENCY DEPT VISIT HI MDM: CPT | Performed by: INTERNAL MEDICINE

## 2024-04-03 PROCEDURE — 84484 ASSAY OF TROPONIN QUANT: CPT | Performed by: INTERNAL MEDICINE

## 2024-04-03 PROCEDURE — 36415 COLL VENOUS BLD VENIPUNCTURE: CPT | Performed by: INTERNAL MEDICINE

## 2024-04-03 NOTE — ED PROVIDER NOTES
History  Chief Complaint   Patient presents with    Chest Pain     Pt presents to ed via walk in with chest pain and headache intermittent for 2 days      This is a 23 years old came for having chest pain for 2 days.  Patient has pain on and off.  Patient denies any SOB, nausea, vomiting, diaphoresis.  Patient has no past medical history takes no medications.  Patient requested because of her work as she did not go to work today.  Patient has no fever, cough, abdominal pain.  Patient has headache before and she took Aleve and the headache almost is gone.  Patient sitting comfortably at the ER with no distress.  Patient described the chest pain and the left upper part of the chest which increased when she pressed on it.  Patient denies smoking and denies taking illicit drugs.        None       Past Medical History:   Diagnosis Date    Anemia     Anxiety     Constipation     Depression     GERD (gastroesophageal reflux disease)     Irritable bowel syndrome     Lower abdominal pain     Nausea     Weight loss        Past Surgical History:   Procedure Laterality Date    NO PAST SURGERIES         Family History   Problem Relation Age of Onset    No Known Problems Mother     Depression Father     Mental illness Father     Seizures Sister     No Known Problems Brother     Colon cancer Maternal Grandmother     Diabetes Maternal Grandmother     No Known Problems Paternal Grandmother     No Known Problems Paternal Grandfather     Breast cancer Neg Hx     Ovarian cancer Neg Hx      I have reviewed and agree with the history as documented.    E-Cigarette/Vaping    E-Cigarette Use Current Every Day User     Cartridges/Day 1      E-Cigarette/Vaping Substances    Nicotine Yes     THC No     CBD No     Flavoring Yes     Other No     Unknown No      Social History     Tobacco Use    Smoking status: Former     Types: Cigars     Quit date:      Years since quittin.2    Smokeless tobacco: Never   Vaping Use    Vaping status:  Every Day    Substances: Nicotine, Flavoring   Substance Use Topics    Alcohol use: Not Currently     Comment: socailly     Drug use: Not Currently       Review of Systems   Constitutional:  Negative for fatigue and fever.   HENT:  Negative for congestion, ear pain, facial swelling, sore throat, tinnitus and trouble swallowing.    Respiratory:  Negative for cough and shortness of breath.    Cardiovascular:  Positive for chest pain. Negative for palpitations.   Gastrointestinal:  Negative for abdominal pain, diarrhea, nausea and vomiting.   Genitourinary:  Negative for difficulty urinating, dysuria, flank pain and frequency.   Musculoskeletal:  Negative for back pain, myalgias, neck pain and neck stiffness.   Skin:  Negative for color change, pallor and rash.   Neurological:  Positive for headaches. Negative for dizziness and light-headedness.   Psychiatric/Behavioral:  Negative for agitation and behavioral problems.        Physical Exam  Physical Exam  Vitals and nursing note reviewed.   Constitutional:       General: She is not in acute distress.     Appearance: She is well-developed. She is not ill-appearing, toxic-appearing or diaphoretic.   HENT:      Head: Normocephalic and atraumatic.      Mouth/Throat:      Pharynx: No oropharyngeal exudate.   Neck:      Thyroid: No thyromegaly.      Vascular: No hepatojugular reflux or JVD.      Trachea: No tracheal deviation.   Cardiovascular:      Rate and Rhythm: Normal rate and regular rhythm.      Pulses:           Carotid pulses are 2+ on the right side and 2+ on the left side.       Radial pulses are 2+ on the right side and 2+ on the left side.        Dorsalis pedis pulses are 2+ on the right side and 2+ on the left side.        Posterior tibial pulses are 2+ on the right side and 2+ on the left side.      Heart sounds: Normal heart sounds. Heart sounds not distant. No murmur heard.     No systolic murmur is present.      No diastolic murmur is present.      No  friction rub. No gallop. No S3 or S4 sounds.   Pulmonary:      Effort: Pulmonary effort is normal. No respiratory distress.      Breath sounds: Normal breath sounds. No decreased breath sounds, wheezing or rhonchi.   Chest:      Chest wall: No mass, deformity, tenderness, crepitus or edema. There is no dullness to percussion.   Abdominal:      General: Bowel sounds are normal. There is no abdominal bruit.      Palpations: Abdomen is soft. There is no fluid wave, hepatomegaly, splenomegaly or mass.      Tenderness: There is no abdominal tenderness. There is no guarding or rebound.   Musculoskeletal:         General: No tenderness or deformity. Normal range of motion.      Cervical back: Normal range of motion and neck supple.      Right lower leg: No tenderness. No edema.      Left lower leg: No tenderness. No edema.   Lymphadenopathy:      Cervical: No cervical adenopathy.   Skin:     General: Skin is warm and dry.      Capillary Refill: Capillary refill takes less than 2 seconds.      Coloration: Skin is not cyanotic or pale.      Findings: No ecchymosis, erythema or rash.      Nails: There is no clubbing.   Neurological:      Mental Status: She is alert and oriented to person, place, and time.   Psychiatric:         Behavior: Behavior normal.         Vital Signs  ED Triage Vitals   Temperature Pulse Respirations Blood Pressure SpO2   04/03/24 1223 04/03/24 1215 04/03/24 1215 04/03/24 1215 04/03/24 1215   98.7 °F (37.1 °C) 71 19 123/60 100 %      Temp Source Heart Rate Source Patient Position - Orthostatic VS BP Location FiO2 (%)   04/03/24 1223 04/03/24 1215 04/03/24 1215 04/03/24 1215 --   Oral Monitor Sitting Left arm       Pain Score       --                  Vitals:    04/03/24 1215   BP: 123/60   Pulse: 71   Patient Position - Orthostatic VS: Sitting         Visual Acuity      ED Medications  Medications - No data to display    Diagnostic Studies  Results Reviewed       Procedure Component Value Units  Date/Time    Basic metabolic panel [[id number]] Collected: 04/03/24 1240    Lab Status: Final result Specimen: Blood from Arm, Right Updated: 04/03/24 1322     Sodium 139 mmol/L      Potassium 3.7 mmol/L      Chloride 106 mmol/L      CO2 27 mmol/L      ANION GAP 6 mmol/L      BUN 8 mg/dL      Creatinine 0.68 mg/dL      Glucose 92 mg/dL      Calcium 8.9 mg/dL      eGFR 123 ml/min/1.73sq m     Narrative:      National Kidney Disease Foundation guidelines for Chronic Kidney Disease (CKD):     Stage 1 with normal or high GFR (GFR > 90 mL/min/1.73 square meters)    Stage 2 Mild CKD (GFR = 60-89 mL/min/1.73 square meters)    Stage 3A Moderate CKD (GFR = 45-59 mL/min/1.73 square meters)    Stage 3B Moderate CKD (GFR = 30-44 mL/min/1.73 square meters)    Stage 4 Severe CKD (GFR = 15-29 mL/min/1.73 square meters)    Stage 5 End Stage CKD (GFR <15 mL/min/1.73 square meters)  Note: GFR calculation is accurate only with a steady state creatinine    High Sensitivity Troponin I Random [[id number]]  (Abnormal) Collected: 04/03/24 1240    Lab Status: Final result Specimen: Blood from Arm, Right Updated: 04/03/24 1309     HS TnI random <2 ng/L     CBC and differential [[id number]]  (Abnormal) Collected: 04/03/24 1240    Lab Status: Final result Specimen: Blood from Arm, Right Updated: 04/03/24 1246     WBC 5.46 Thousand/uL      RBC 4.10 Million/uL      Hemoglobin 9.7 g/dL      Hematocrit 30.2 %      MCV 74 fL      MCH 23.7 pg      MCHC 32.1 g/dL      RDW 15.9 %      MPV 9.9 fL      Platelets 468 Thousands/uL      nRBC 0 /100 WBCs      Neutrophils Relative 60 %      Immature Grans % 0 %      Lymphocytes Relative 31 %      Monocytes Relative 8 %      Eosinophils Relative 1 %      Basophils Relative 0 %      Neutrophils Absolute 3.30 Thousands/µL      Absolute Immature Grans 0.01 Thousand/uL      Absolute Lymphocytes 1.67 Thousands/µL      Absolute Monocytes 0.42 Thousand/µL      Eosinophils Absolute 0.04 Thousand/µL      Basophils  Absolute 0.02 Thousands/µL                    No orders to display              Procedures  ECG 12 Lead Documentation Only    Date/Time: 4/3/2024 12:20 PM    Performed by: Jer Alonzo MD  Authorized by: Jer Alonzo MD    Indications / Diagnosis:  CHEST PAIN  ECG reviewed by me, the ED Provider: yes    Patient location:  ED  Previous ECG:     Previous ECG:  Unavailable    Comparison to cardiac monitor: Yes    Interpretation:     Interpretation: normal    Rate:     ECG rate:  23    ECG rate assessment: normal    Rhythm:     Rhythm: sinus rhythm    QRS:     QRS axis:  Normal    QRS intervals:  Normal  Conduction:     Conduction: normal    ST segments:     ST segments:  Normal  T waves:     T waves: normal             ED Course             HEART Risk Score      Flowsheet Row Most Recent Value   Heart Score Risk Calculator    History 0 Filed at: 04/03/2024 1328   ECG 0 Filed at: 04/03/2024 1328   Age 0 Filed at: 04/03/2024 1328   Risk Factors 0 Filed at: 04/03/2024 1328   Troponin 0 Filed at: 04/03/2024 1328   HEART Score 0 Filed at: 04/03/2024 1328                          SBIRT 20yo+      Flowsheet Row Most Recent Value   Initial Alcohol Screen: US AUDIT-C     1. How often do you have a drink containing alcohol? 0 Filed at: 04/03/2024 1208   2. How many drinks containing alcohol do you have on a typical day you are drinking?  0 Filed at: 04/03/2024 1208   3a. Male UNDER 65: How often do you have five or more drinks on one occasion? 0 Filed at: 04/03/2024 1208   3b. FEMALE Any Age, or MALE 65+: How often do you have 4 or more drinks on one occassion? 0 Filed at: 04/03/2024 1208   Audit-C Score 0 Filed at: 04/03/2024 1208   JESSENIA: How many times in the past year have you...    Used an illegal drug or used a prescription medication for non-medical reasons? Never Filed at: 04/03/2024 1208                      Medical Decision Making  This is a 23 years old came for having chest pain for 2 days.  Pain comes  and goes.  Patient has no nausea vomiting diaphoresis.  Pain is on the left upper chest nonradiating and its pressure-like.  Pain increased when she pressed on it.  Patient has no history of CAD.  Patient has no medical history takes no medications.  Patient requested to have the day off of work.  Physical exam shows no pertinent positive findings.  Heart is regular no murmur no gallop.  Labs reviewed H/H 9.7/30.2 , CMP are WNL, Troponin <2.  Patient heart score is 0.  Patient to be discharged home follow-up with her PMD.  Patient requested a note for her work .  All question concerns of patient having further addressed.    Amount and/or Complexity of Data Reviewed  Labs: ordered.     Details: H/H 9.7/30.2 , CMP are WNL, Troponin <2    ECG/medicine tests: ordered and independent interpretation performed.     Details: EKG; NSR rate 77/min no ischemic changes normal EKG.             Disposition  Final diagnoses:   Chest pain, unspecified type     Time reflects when diagnosis was documented in both MDM as applicable and the Disposition within this note       Time User Action Codes Description Comment    4/3/2024  1:28 PM Jer Alonzo Add [R07.9] Chest pain, unspecified type           ED Disposition       ED Disposition   Discharge    Condition   Stable    Date/Time   Wed Apr 3, 2024 1329    Comment   Serg Babluena discharge to home/self care.                   Follow-up Information       Follow up With Specialties Details Why Contact Info Additional Information    [facility] In 1 week  [address] [facility]  [address]  [facility] [address] [phone] [facility], [address] [facility], [address], [place], [address], [phone]            There are no discharge medications for this patient.      No discharge procedures on file.    PDMP Review       None            ED Provider  Electronically Signed by             Jer TREVINO  MD Cheri  04/05/24 9991

## 2024-04-03 NOTE — DISCHARGE INSTRUCTIONS
Follow-up with medical clinic.  Take Tylenol or Motrin for pain if needed.  Labs Reviewed   CBC AND DIFFERENTIAL - Abnormal       Result Value Ref Range Status    WBC 5.46  4.31 - 10.16 Thousand/uL Final    RBC 4.10  3.81 - 5.12 Million/uL Final    Hemoglobin 9.7 (*) 11.5 - 15.4 g/dL Final    Hematocrit 30.2 (*) 34.8 - 46.1 % Final    MCV 74 (*) 82 - 98 fL Final    MCH 23.7 (*) 26.8 - 34.3 pg Final    MCHC 32.1  31.4 - 37.4 g/dL Final    RDW 15.9 (*) 11.6 - 15.1 % Final    MPV 9.9  8.9 - 12.7 fL Final    Platelets 468 (*) 149 - 390 Thousands/uL Final    nRBC 0  /100 WBCs Final    Neutrophils Relative 60  43 - 75 % Final    Immature Grans % 0  0 - 2 % Final    Lymphocytes Relative 31  14 - 44 % Final    Monocytes Relative 8  4 - 12 % Final    Eosinophils Relative 1  0 - 6 % Final    Basophils Relative 0  0 - 1 % Final    Neutrophils Absolute 3.30  1.85 - 7.62 Thousands/µL Final    Absolute Immature Grans 0.01  0.00 - 0.20 Thousand/uL Final    Absolute Lymphocytes 1.67  0.60 - 4.47 Thousands/µL Final    Absolute Monocytes 0.42  0.17 - 1.22 Thousand/µL Final    Eosinophils Absolute 0.04  0.00 - 0.61 Thousand/µL Final    Basophils Absolute 0.02  0.00 - 0.10 Thousands/µL Final   HIGH SENSITIVITY TROPONIN I RANDOM - Abnormal    HS TnI random <2 (*) 8 - 18 ng/L Final    Comment:                                              Initial (time 0) result  If >=50 ng/L, Myocardial injury suggested ;  Type of myocardial injury and treatment strategy  to be determined.  If 5-49 ng/L, a delta result at 2 hours and or 4 hours will be needed to further evaluate.  If <4 ng/L, and chest pain has been >3 hours since onset, patient may qualify for discharge based on the HEART score in the ED.  If <5 ng/L and <3hours since onset of chest pain, a delta result at 2 hours will be needed to further evaluate.    HS Troponin 99th Percentile URL of a Health Population=12 ng/L with a 95% Confidence Interval of 8-18 ng/L.    Second Troponin (time 2  hours)  If calculated delta >= 20 ng/L,  Myocardial injury suggested ; Type of myocardial injury and treatment strategy to be determined.  If 5-49 ng/L and the calculated delta is 5-19 ng/L, consult medical service for evaluation.  Continue evaluation for ischemia on ecg and other possible etiology and repeat hs troponin at 4 hours.  If delta is <5 ng/L at 2 hours, consider discharge based on risk stratification via the HEART score (if in ED), or FREDRICK risk score in IP/Observation.    HS Troponin 99th Percentile URL of a Health Population=12 ng/L with a 95% Confidence Interval of 8-18 ng/L.   BASIC METABOLIC PANEL    Sodium 139  135 - 147 mmol/L Final    Potassium 3.7  3.5 - 5.3 mmol/L Final    Chloride 106  96 - 108 mmol/L Final    CO2 27  21 - 32 mmol/L Final    ANION GAP 6  4 - 13 mmol/L Final    BUN 8  5 - 25 mg/dL Final    Creatinine 0.68  0.60 - 1.30 mg/dL Final    Comment: Standardized to IDMS reference method    Glucose 92  65 - 140 mg/dL Final    Comment: If the patient is fasting, the ADA then defines impaired fasting glucose as > 100 mg/dL and diabetes as > or equal to 123 mg/dL.    Calcium 8.9  8.4 - 10.2 mg/dL Final    eGFR 123  ml/min/1.73sq m Final    Narrative:     National Kidney Disease Foundation guidelines for Chronic Kidney Disease (CKD):     Stage 1 with normal or high GFR (GFR > 90 mL/min/1.73 square meters)    Stage 2 Mild CKD (GFR = 60-89 mL/min/1.73 square meters)    Stage 3A Moderate CKD (GFR = 45-59 mL/min/1.73 square meters)    Stage 3B Moderate CKD (GFR = 30-44 mL/min/1.73 square meters)    Stage 4 Severe CKD (GFR = 15-29 mL/min/1.73 square meters)    Stage 5 End Stage CKD (GFR <15 mL/min/1.73 square meters)  Note: GFR calculation is accurate only with a steady state creatinine

## 2024-04-03 NOTE — Clinical Note
Serg Balbuena was seen and treated in our emergency department on 4/3/2024.                Diagnosis:     Serg  may return to work on return date.    She may return on this date: 04/04/2024         If you have any questions or concerns, please don't hesitate to call.      Jer Alonzo MD    ______________________________           _______________          _______________  Hospital Representative                              Date                                Time

## 2024-10-03 ENCOUNTER — HOSPITAL ENCOUNTER (EMERGENCY)
Facility: HOSPITAL | Age: 24
Discharge: HOME | End: 2024-10-03
Attending: EMERGENCY MEDICINE
Payer: COMMERCIAL

## 2024-10-03 VITALS
DIASTOLIC BLOOD PRESSURE: 66 MMHG | HEART RATE: 89 BPM | SYSTOLIC BLOOD PRESSURE: 127 MMHG | RESPIRATION RATE: 18 BRPM | OXYGEN SATURATION: 100 % | TEMPERATURE: 98.1 F

## 2024-10-03 DIAGNOSIS — J02.9 VIRAL PHARYNGITIS: Primary | ICD-10-CM

## 2024-10-03 LAB — S PYO DNA THROAT QL NAA+PROBE: NOT DETECTED

## 2024-10-03 PROCEDURE — 87651 STREP A DNA AMP PROBE: CPT | Performed by: EMERGENCY MEDICINE

## 2024-10-03 PROCEDURE — 99284 EMERGENCY DEPT VISIT MOD MDM: CPT | Performed by: EMERGENCY MEDICINE

## 2024-10-03 PROCEDURE — 99283 EMERGENCY DEPT VISIT LOW MDM: CPT

## 2024-10-03 RX ORDER — IBUPROFEN 400 MG/1
400 TABLET, FILM COATED ORAL ONCE
Status: COMPLETED | OUTPATIENT
Start: 2024-10-03 | End: 2024-10-03

## 2024-10-03 RX ADMIN — DEXAMETHASONE SODIUM PHOSPHATE 10 MG: 10 INJECTION, SOLUTION INTRAMUSCULAR; INTRAVENOUS at 10:15

## 2024-10-03 RX ADMIN — IBUPROFEN 400 MG: 400 TABLET, FILM COATED ORAL at 10:15

## 2024-10-03 NOTE — DISCHARGE INSTRUCTIONS
Please return if you develop any worsening symptoms.  You may return at any time if you have any further concerns.  Please follow up with your doctor in the next few days.

## 2024-10-03 NOTE — Clinical Note
Serg Balbuena was seen and treated in our emergency department on 10/3/2024.    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    She may return on this date: 10/07/2024    May return on 10/4/24 only if feeling better and fever free     If you have any questions or concerns, please don't hesitate to call.      Reg Cruz, DO    ______________________________           _______________          _______________  Hospital Representative                              Date                                Time

## 2024-10-03 NOTE — ED PROVIDER NOTES
Final diagnoses:   None     ED Disposition       None          Assessment & Plan       Medical Decision Making  23-year-old female presenting with sore throat.  She works with geriatric patients.  There have been similar illnesses in her patients.  Cough is worse at nighttime.  Discussed this is likely postnasal drip.  She has a productive cough in the morning from the postnasal drip that clears with a day.  On exam, there is mild pharyngeal erythema with very mild exudate.  Uvula midline.  Will treat with Decadron and Motrin as likely viral pharyngitis.  Will check strep throat and will start antibiotics if positive    Amount and/or Complexity of Data Reviewed  Labs: ordered.    Risk  Prescription drug management.        ED Course as of 10/03/24 1055   Thu Oct 03, 2024   1053 Strep neg.  Discussed return precautions       Medications   dexamethasone oral liquid 10 mg 1 mL (has no administration in time range)   ibuprofen (MOTRIN) tablet 400 mg (has no administration in time range)       ED Risk Strat Scores                           SBIRT 20yo+      Flowsheet Row Most Recent Value   Initial Alcohol Screen: US AUDIT-C     1. How often do you have a drink containing alcohol? 0 Filed at: 10/03/2024 1004   2. How many drinks containing alcohol do you have on a typical day you are drinking?  0 Filed at: 10/03/2024 1004   3b. FEMALE Any Age, or MALE 65+: How often do you have 4 or more drinks on one occassion? 0 Filed at: 10/03/2024 1004   Audit-C Score 0 Filed at: 10/03/2024 1004   JESSENIA: How many times in the past year have you...    Used an illegal drug or used a prescription medication for non-medical reasons? Never Filed at: 10/03/2024 1004                            History of Present Illness       Chief Complaint   Patient presents with   • Sore Throat     Pt c/o sore throat, HA, and nausea, mild SOB, and stomach pains since Monday. No meds prior to arrival.        Past Medical History:   Diagnosis Date   • Anemia     • Anxiety    • Constipation    • Depression    • GERD (gastroesophageal reflux disease)    • Irritable bowel syndrome    • Lower abdominal pain    • Nausea    • Weight loss       Past Surgical History:   Procedure Laterality Date   • NO PAST SURGERIES        Family History   Problem Relation Age of Onset   • No Known Problems Mother    • Depression Father    • Mental illness Father    • Seizures Sister    • No Known Problems Brother    • Colon cancer Maternal Grandmother    • Diabetes Maternal Grandmother    • No Known Problems Paternal Grandmother    • No Known Problems Paternal Grandfather    • Breast cancer Neg Hx    • Ovarian cancer Neg Hx       Social History     Tobacco Use   • Smoking status: Former     Types: Cigars     Quit date:      Years since quittin.7   • Smokeless tobacco: Never   Vaping Use   • Vaping status: Every Day   • Substances: Nicotine, Flavoring   Substance Use Topics   • Alcohol use: Not Currently     Comment: socailly    • Drug use: Not Currently      E-Cigarette/Vaping   • E-Cigarette Use Current Every Day User    • Cartridges/Day 1       E-Cigarette/Vaping Substances   • Nicotine Yes    • THC No    • CBD No    • Flavoring Yes    • Other No    • Unknown No       I have reviewed and agree with the history as documented.     23-year-old female presenting with sore throat.  There are similar sick contacts where she works.  She works with geriatric patients.  She complains of worsening cough when she lays down.  She has a productive cough in the morning that clears of the day.  She denies fevers.  She does complain of mild shortness of breath.  She states she recently took a home COVID test which was negative        Review of Systems   Constitutional:  Negative for fever.   Respiratory:  Positive for cough and shortness of breath.    Cardiovascular:  Negative for chest pain.   Musculoskeletal:  Negative for neck pain.   Skin:  Negative for rash.           Objective       ED Triage  Vitals [10/03/24 1001]   Temperature Pulse Blood Pressure Respirations SpO2 Patient Position - Orthostatic VS   98.1 °F (36.7 °C) 89 127/66 18 100 % Sitting      Temp Source Heart Rate Source BP Location FiO2 (%) Pain Score    Oral Monitor Left arm -- No Pain      Vitals      Date and Time Temp Pulse SpO2 Resp BP Pain Score FACES Pain Rating User   10/03/24 1001 98.1 °F (36.7 °C) 89 100 % 18 127/66 No Pain -- MS            Physical Exam  Vitals and nursing note reviewed.   Constitutional:       General: She is not in acute distress.  HENT:      Head: Normocephalic and atraumatic.      Mouth/Throat:      Mouth: Mucous membranes are moist.      Pharynx: Oropharyngeal exudate and posterior oropharyngeal erythema present. No uvula swelling.      Tonsils: No tonsillar abscesses.   Eyes:      Conjunctiva/sclera: Conjunctivae normal.   Pulmonary:      Effort: Pulmonary effort is normal. No respiratory distress.      Breath sounds: Normal breath sounds.   Abdominal:      General: There is no distension.   Musculoskeletal:      Cervical back: No rigidity.   Skin:     General: Skin is warm and dry.   Neurological:      Mental Status: She is alert. Mental status is at baseline.   Psychiatric:         Mood and Affect: Mood normal.         Results Reviewed       Procedure Component Value Units Date/Time    Strep A PCR [[id number]]     Lab Status: No result Specimen: Throat             No orders to display       Procedures    ED Medication and Procedure Management   None     Patient's Medications    No medications on file     No discharge procedures on file.  ED SEPSIS DOCUMENTATION            Reg Cruz,   10/03/24 1055

## 2024-11-14 ENCOUNTER — TELEMEDICINE (OUTPATIENT)
Dept: OTHER | Facility: HOSPITAL | Age: 24
End: 2024-11-14
Payer: COMMERCIAL

## 2024-11-14 DIAGNOSIS — L98.9 SKIN LESION: Primary | ICD-10-CM

## 2024-11-14 PROCEDURE — 99213 OFFICE O/P EST LOW 20 MIN: CPT | Performed by: NURSE PRACTITIONER

## 2024-11-14 NOTE — PATIENT INSTRUCTIONS
I ordered another herpes test at your request.  Unclear cause of this.  I placed a dermatology referral.

## 2024-11-14 NOTE — PROGRESS NOTES
Virtual Regular Visit  Name: Serg Balbuena      : 2000      MRN: [id number]  Encounter Provider: Your Video Visit Provider  Encounter Date: 2024   Encounter department: VIRTUAL CARE     Verification of patient location:    Patient is located at Home in the following state in which I hold an active license [place]    :  Assessment & Plan  Skin lesion    Orders:    Ambulatory Referral to Dermatology; Future    HSV TYPE 1,2 DNA PCR; Future        Encounter provider Your Video Visit Provider    The patient was identified by name and date of birth. Serg Balbuena was informed that this is a telemedicine visit and that the visit is being conducted through the Epic Embedded platform. She agrees to proceed..  My office door was closed. No one else was in the room.  She acknowledged consent and understanding of privacy and security of the video platform. The patient has agreed to participate and understands they can discontinue the visit at any time.    Patient is aware this is a billable service.     History of Present Illness History of Present Illness     This is a 23 year old female here today for video visit.  She states she has bump on her lip which is not going away.  She has been tested for herpes which was equivalent. She states she has had this for about 3 months.  On 10/22 she did do a virtual visit with [facility].  She was given a antibiotic and bactraban but had no resolution of symptoms.       History obtained from: patient  Review of Systems   Constitutional: Negative.    Respiratory: Negative.     Cardiovascular: Negative.    Skin:  Positive for rash.   Neurological: Negative.    Psychiatric/Behavioral: Negative.       Objective     There were no vitals taken for this visit.  Physical Exam  Constitutional:       General: She is not in acute distress.     Appearance: Normal appearance. She is not ill-appearing or toxic-appearing.   HENT:      Head:        Comments: One small pimple like  lesion.  Neurological:      Mental Status: She is alert and oriented to person, place, and time.   Psychiatric:         Mood and Affect: Mood normal.         Behavior: Behavior normal.         Thought Content: Thought content normal.         Judgment: Judgment normal.         Visit Time  Total Visit Duration:

## 2024-11-15 ENCOUNTER — APPOINTMENT (OUTPATIENT)
Dept: LAB | Facility: HOSPITAL | Age: 24
End: 2024-11-15
Payer: COMMERCIAL

## 2024-11-15 DIAGNOSIS — L98.9 SKIN LESION: ICD-10-CM

## 2024-11-15 PROCEDURE — 87529 HSV DNA AMP PROBE: CPT

## 2024-11-19 ENCOUNTER — RESULTS FOLLOW-UP (OUTPATIENT)
Dept: OTHER | Facility: HOSPITAL | Age: 24
End: 2024-11-19

## 2024-11-19 LAB
HSV1 DNA SPEC QL NAA+PROBE: NEGATIVE
HSV2 DNA SPEC QL NAA+PROBE: NEGATIVE

## 2025-01-02 ENCOUNTER — APPOINTMENT (EMERGENCY)
Dept: ULTRASOUND IMAGING | Facility: HOSPITAL | Age: 25
End: 2025-01-02

## 2025-01-02 ENCOUNTER — HOSPITAL ENCOUNTER (EMERGENCY)
Facility: HOSPITAL | Age: 25
Discharge: HOME | End: 2025-01-02
Attending: EMERGENCY MEDICINE

## 2025-01-02 VITALS
TEMPERATURE: 98.8 F | DIASTOLIC BLOOD PRESSURE: 67 MMHG | OXYGEN SATURATION: 99 % | RESPIRATION RATE: 18 BRPM | HEART RATE: 93 BPM | SYSTOLIC BLOOD PRESSURE: 108 MMHG

## 2025-01-02 DIAGNOSIS — N39.0 UTI (URINARY TRACT INFECTION): Primary | ICD-10-CM

## 2025-01-02 DIAGNOSIS — O26.891 PELVIC PAIN AFFECTING PREGNANCY IN FIRST TRIMESTER, ANTEPARTUM: ICD-10-CM

## 2025-01-02 DIAGNOSIS — R10.2 PELVIC PAIN AFFECTING PREGNANCY IN FIRST TRIMESTER, ANTEPARTUM: ICD-10-CM

## 2025-01-02 LAB
ABO GROUP BLD: NORMAL
ALBUMIN SERPL BCG-MCNC: 4.2 G/DL (ref 3.5–5)
ALP SERPL-CCNC: 53 U/L (ref 34–104)
ALT SERPL W P-5'-P-CCNC: 9 U/L (ref 7–52)
ANION GAP SERPL CALCULATED.3IONS-SCNC: 7 MMOL/L (ref 4–13)
AST SERPL W P-5'-P-CCNC: 17 U/L (ref 13–39)
B-HCG SERPL-ACNC: ABNORMAL MIU/ML (ref 0–5)
BACTERIA UR QL AUTO: ABNORMAL /HPF
BASOPHILS # BLD AUTO: 0.04 THOUSANDS/ΜL (ref 0–0.1)
BASOPHILS NFR BLD AUTO: 0 % (ref 0–1)
BILIRUB SERPL-MCNC: 0.65 MG/DL (ref 0.2–1)
BILIRUB UR QL STRIP: NEGATIVE
BLD GP AB SCN SERPL QL: NEGATIVE
BUN SERPL-MCNC: 12 MG/DL (ref 5–25)
CALCIUM SERPL-MCNC: 8.9 MG/DL (ref 8.4–10.2)
CHLORIDE SERPL-SCNC: 105 MMOL/L (ref 96–108)
CLARITY UR: CLEAR
CO2 SERPL-SCNC: 23 MMOL/L (ref 21–32)
COLOR UR: ABNORMAL
CREAT SERPL-MCNC: 0.6 MG/DL (ref 0.6–1.3)
EOSINOPHIL # BLD AUTO: 0.04 THOUSAND/ΜL (ref 0–0.61)
EOSINOPHIL NFR BLD AUTO: 0 % (ref 0–6)
ERYTHROCYTE [DISTWIDTH] IN BLOOD BY AUTOMATED COUNT: 17.8 % (ref 11.6–15.1)
EXT PREGNANCY TEST URINE: POSITIVE
EXT. CONTROL: ABNORMAL
GFR SERPL CREATININE-BSD FRML MDRD: 127 ML/MIN/1.73SQ M
GLUCOSE SERPL-MCNC: 98 MG/DL (ref 65–140)
GLUCOSE UR STRIP-MCNC: NEGATIVE MG/DL
HCT VFR BLD AUTO: 29.4 % (ref 34.8–46.1)
HGB BLD-MCNC: 9.7 G/DL (ref 11.5–15.4)
HGB UR QL STRIP.AUTO: NEGATIVE
IMM GRANULOCYTES # BLD AUTO: 0.03 THOUSAND/UL (ref 0–0.2)
IMM GRANULOCYTES NFR BLD AUTO: 0 % (ref 0–2)
KETONES UR STRIP-MCNC: NEGATIVE MG/DL
LEUKOCYTE ESTERASE UR QL STRIP: ABNORMAL
LYMPHOCYTES # BLD AUTO: 1.27 THOUSANDS/ΜL (ref 0.6–4.47)
LYMPHOCYTES NFR BLD AUTO: 12 % (ref 14–44)
MCH RBC QN AUTO: 23.9 PG (ref 26.8–34.3)
MCHC RBC AUTO-ENTMCNC: 33 G/DL (ref 31.4–37.4)
MCV RBC AUTO: 72 FL (ref 82–98)
MONOCYTES # BLD AUTO: 0.72 THOUSAND/ΜL (ref 0.17–1.22)
MONOCYTES NFR BLD AUTO: 7 % (ref 4–12)
MUCOUS THREADS UR QL AUTO: ABNORMAL
NEUTROPHILS # BLD AUTO: 8.39 THOUSANDS/ΜL (ref 1.85–7.62)
NEUTS SEG NFR BLD AUTO: 81 % (ref 43–75)
NITRITE UR QL STRIP: NEGATIVE
NON-SQ EPI CELLS URNS QL MICRO: ABNORMAL /HPF
NRBC BLD AUTO-RTO: 0 /100 WBCS
PH UR STRIP.AUTO: 6 [PH]
PLATELET # BLD AUTO: 418 THOUSANDS/UL (ref 149–390)
PMV BLD AUTO: 10 FL (ref 8.9–12.7)
POTASSIUM SERPL-SCNC: 4 MMOL/L (ref 3.5–5.3)
PROT SERPL-MCNC: 7.2 G/DL (ref 6.4–8.4)
PROT UR STRIP-MCNC: NEGATIVE MG/DL
RBC # BLD AUTO: 4.06 MILLION/UL (ref 3.81–5.12)
RBC #/AREA URNS AUTO: ABNORMAL /HPF
RH BLD: POSITIVE
SODIUM SERPL-SCNC: 135 MMOL/L (ref 135–147)
SP GR UR STRIP.AUTO: 1.01 (ref 1–1.03)
SPECIMEN EXPIRATION DATE: NORMAL
UROBILINOGEN UR STRIP-ACNC: <2 MG/DL
WBC # BLD AUTO: 10.49 THOUSAND/UL (ref 4.31–10.16)
WBC #/AREA URNS AUTO: ABNORMAL /HPF

## 2025-01-02 PROCEDURE — 86900 BLOOD TYPING SEROLOGIC ABO: CPT

## 2025-01-02 PROCEDURE — 80053 COMPREHEN METABOLIC PANEL: CPT

## 2025-01-02 PROCEDURE — 36415 COLL VENOUS BLD VENIPUNCTURE: CPT

## 2025-01-02 PROCEDURE — 99284 EMERGENCY DEPT VISIT MOD MDM: CPT

## 2025-01-02 PROCEDURE — 85025 COMPLETE CBC W/AUTO DIFF WBC: CPT

## 2025-01-02 PROCEDURE — 86901 BLOOD TYPING SEROLOGIC RH(D): CPT

## 2025-01-02 PROCEDURE — 84702 CHORIONIC GONADOTROPIN TEST: CPT

## 2025-01-02 PROCEDURE — 99284 EMERGENCY DEPT VISIT MOD MDM: CPT | Performed by: EMERGENCY MEDICINE

## 2025-01-02 PROCEDURE — 81001 URINALYSIS AUTO W/SCOPE: CPT

## 2025-01-02 PROCEDURE — 81025 URINE PREGNANCY TEST: CPT

## 2025-01-02 PROCEDURE — 86850 RBC ANTIBODY SCREEN: CPT

## 2025-01-02 PROCEDURE — 76801 OB US < 14 WKS SINGLE FETUS: CPT

## 2025-01-02 RX ORDER — CEPHALEXIN 500 MG/1
500 CAPSULE ORAL 4 TIMES DAILY
Qty: 28 CAPSULE | Refills: 0 | Status: SHIPPED | OUTPATIENT
Start: 2025-01-02 | End: 2025-01-09

## 2025-01-02 RX ADMIN — CEPHALEXIN 500 MG: 250 CAPSULE ORAL at 07:01

## 2025-01-02 NOTE — ED PROVIDER NOTES
Time reflects when diagnosis was documented in both MDM as applicable and the Disposition within this note       Time User Action Codes Description Comment    1/2/2025  6:45 AM Georgina Pires Add [N39.0] UTI (urinary tract infection)     1/2/2025  7:52 AM Georgina Pires Add [R10.2] Pelvic pain     1/2/2025  7:53 AM Georgina Pires Add [O26.891,  R10.2] Pelvic pain affecting pregnancy in first trimester, antepartum     1/2/2025  7:56 AM Georgina Pires Remove [R10.2] Pelvic pain           ED Disposition       ED Disposition   Discharge    Condition   Stable    Date/Time   u Jan 2, 2025  7:56 AM    Comment   Serg Balbuena discharge to home/self care.                   Assessment & Plan       Medical Decision Making  24-year-old female with no PMH presenting for lower abdominal cramping/pain that started 4.5 hours prior to arrival.    Differential diagnoses: IUP, ectopic pregnancy, contractions, ovarian torsion, UTI    Patient presents afebrile and hemodynamically stable. Denies need for any pain or nausea medication. Mild leukocytosis, but otherwise CBC and CMP consistent with baseline. UA shows signs of UTI and patient given first dose of po keflex. Transvaginal US performed confirmed a single live IUP at 6w4d (SIMONE 8/24/2025) and a small left lateral subchorionic collection. Beta-Hcg levels appropriate for 6 weeks gestation. Reviewed imaging findings with the patient. Discussed that subchorionic collections are very common in early pregnancy and may result in some vaginal bleeding. If she does experience any bleeding, patient advised to return to the ED. Patient comfortable with and stable for discharge home with return precautions, close OBGYN follow-up (outpatient visit already scheduled for 1/6/2025), and a prescription for keflex.    Amount and/or Complexity of Data Reviewed  Labs: ordered. Decision-making details documented in ED Course.  Radiology: ordered.    Risk  Prescription drug  management.        ED Course as of 01/02/25 1236   Thu Jan 02, 2025   0452 Denies need for any pain or nausea medication at this time.   0524 PREGNANCY TEST URINE(!): Positive   0610 WBC(!): 10.49  Mild leukocytosis.   0611 Hemoglobin(!): 9.7  Microcytic anemia, consistent with baseline.   0611 MCV(!): 72   0611 Leukocytes, UA(!): Trace   0611 Nitrite, UA: Negative   0612 WBC, UA(!): 4-10   0612 Bacteria, UA(!): Innumerable  Ordered first dose of Keflex for UTI.   0612 MUCUS THREADS(!): Occasional   0642 Comprehensive metabolic panel  CMP within normal limits.   0642 Rh Factor: Positive  No RhoGAM indicated.   0739 HCG QUANTITATIVE(!): 111,906.0   0810 Discussed imaging results with        Medications   cephalexin (KEFLEX) capsule 500 mg (500 mg Oral Given 1/2/25 0701)       ED Risk Strat Scores                                              History of Present Illness       Chief Complaint   Patient presents with    Pelvic Pain - Pregnant     Reports 6 weeks pregnant, started with intermittent lower pelvic cramping since midnight. +N       Past Medical History:   Diagnosis Date    Anemia     Anxiety     Constipation     Depression     GERD (gastroesophageal reflux disease)     Irritable bowel syndrome     Lower abdominal pain     Nausea     Weight loss       Past Surgical History:   Procedure Laterality Date    NO PAST SURGERIES        Family History   Problem Relation Age of Onset    No Known Problems Mother     Depression Father     Mental illness Father     Seizures Sister     No Known Problems Brother     Colon cancer Maternal Grandmother     Diabetes Maternal Grandmother     No Known Problems Paternal Grandmother     No Known Problems Paternal Grandfather     Breast cancer Neg Hx     Ovarian cancer Neg Hx       Social History     Tobacco Use    Smoking status: Former     Types: Cigars     Quit date: 2022     Years since quitting: 3.0    Smokeless tobacco: Never   Vaping Use    Vaping status: Every Day     Substances: Nicotine, Flavoring   Substance Use Topics    Alcohol use: Not Currently     Comment: socailly     Drug use: Not Currently      E-Cigarette/Vaping    E-Cigarette Use Current Every Day User     Cartridges/Day 1       E-Cigarette/Vaping Substances    Nicotine Yes     THC No     CBD No     Flavoring Yes     Other No     Unknown No       I have reviewed and agree with the history as documented.     24-year-old female with no PMH presenting for lower abdominal cramping/pain that started 4.5 hours prior to arrival.  Per patient, she is about 6 to 7 weeks pregnant based on last menstrual period and at home urine pregnancy test.  Has not yet seen OB or have had an IUP confirmed on US.  Scheduled for her first appointment next week.  Patient reports bilateral lower abdominal pain/cramping, that is intermittent.  Denies any vaginal bleeding, discharge, loss of fluid, diarrhea, hematuria, or dysuria.  Endorses ongoing nausea/vomiting for the past couple weeks, but no recent changes from this baseline.  Denies any fevers, chills, sweats, chest pain, or SOB.          Review of Systems   Constitutional:  Negative for chills, diaphoresis and fever.   Respiratory:  Negative for shortness of breath.    Cardiovascular:  Negative for chest pain.   Gastrointestinal:  Positive for abdominal pain, nausea and vomiting. Negative for blood in stool and diarrhea.   Genitourinary:  Positive for pelvic pain. Negative for dysuria, hematuria, vaginal bleeding and vaginal discharge.   Musculoskeletal:  Negative for arthralgias and myalgias.   Skin:  Negative for rash and wound.   Neurological:  Negative for light-headedness and headaches.   Psychiatric/Behavioral:  Negative for confusion.    All other systems reviewed and are negative.          Objective       ED Triage Vitals [01/02/25 0410]   Temperature Pulse Blood Pressure Respirations SpO2 Patient Position - Orthostatic VS   98.8 °F (37.1 °C) 93 108/67 18 99 % Sitting      Temp  Source Heart Rate Source BP Location FiO2 (%) Pain Score    Oral Monitor Left arm -- --      Vitals      Date and Time Temp Pulse SpO2 Resp BP Pain Score FACES Pain Rating User   01/02/25 0410 98.8 °F (37.1 °C) 93 99 % 18 108/67 -- -- AM            Physical Exam  Vitals and nursing note reviewed.   Constitutional:       General: She is not in acute distress.     Appearance: Normal appearance. She is normal weight. She is not ill-appearing, toxic-appearing or diaphoretic.   HENT:      Head: Normocephalic and atraumatic.      Right Ear: External ear normal.      Left Ear: External ear normal.      Nose: Nose normal.      Mouth/Throat:      Mouth: Mucous membranes are moist.   Eyes:      General: No scleral icterus.        Right eye: No discharge.         Left eye: No discharge.      Extraocular Movements: Extraocular movements intact.      Conjunctiva/sclera: Conjunctivae normal.   Cardiovascular:      Rate and Rhythm: Normal rate and regular rhythm.      Heart sounds: Normal heart sounds. No murmur heard.  Pulmonary:      Effort: Pulmonary effort is normal. No respiratory distress.      Breath sounds: Normal breath sounds. No wheezing, rhonchi or rales.   Abdominal:      General: Abdomen is flat. There is no distension.      Palpations: Abdomen is soft.      Tenderness: There is no abdominal tenderness. There is no guarding or rebound.      Comments: Bilateral lower abdominal tenderness to palpation.   Musculoskeletal:         General: Normal range of motion.      Cervical back: Normal range of motion.      Right lower leg: No edema.      Left lower leg: No edema.   Skin:     General: Skin is warm and dry.      Capillary Refill: Capillary refill takes less than 2 seconds.   Neurological:      General: No focal deficit present.      Mental Status: She is alert and oriented to person, place, and time. Mental status is at baseline.   Psychiatric:         Mood and Affect: Mood normal.         Behavior: Behavior normal.          Results Reviewed       Procedure Component Value Units Date/Time    hCG, quantitative [[id number]]  (Abnormal) Collected: 01/02/25 0525    Lab Status: Final result Specimen: Blood from Arm, Right Updated: 01/02/25 0735     HCG, Quant 111,906.0 mIU/mL     Narrative:       Expected Ranges:    HCG results between 5.0 and 25.0 mIU/mL may be indicative of early pregnancy but should be interpreted in light of the total clinical presentation.    HCG can rise to detectable levels in marge and post menopausal women (0-11.6 mIU/mL).     Approximate               Approximate HCG  Gestation age          Concentration ( mIU/mL)  _____________          ______________________   Weeks                      HCG values  0.2-1                       5-50  1-2                           2-3                         100-5000  3-4                         500-07083  4-5                         1000-74729  5-6                         [id number]  6-8                         18467-938143  8-12                        29934-458642      Comprehensive metabolic panel [[id number]] Collected: 01/02/25 0525    Lab Status: Final result Specimen: Blood from Arm, Right Updated: 01/02/25 0622     Sodium 135 mmol/L      Potassium 4.0 mmol/L      Chloride 105 mmol/L      CO2 23 mmol/L      ANION GAP 7 mmol/L      BUN 12 mg/dL      Creatinine 0.60 mg/dL      Glucose 98 mg/dL      Calcium 8.9 mg/dL      AST 17 U/L      ALT 9 U/L      Alkaline Phosphatase 53 U/L      Total Protein 7.2 g/dL      Albumin 4.2 g/dL      Total Bilirubin 0.65 mg/dL      eGFR 127 ml/min/1.73sq m     Narrative:      National Kid[employer] Disease Foundation guidelines for Chronic Kidney Disease (CKD):     Stage 1 with normal or high GFR (GFR > 90 mL/min/1.73 square meters)    Stage 2 Mild CKD (GFR = 60-89 mL/min/1.73 square meters)    Stage 3A Moderate CKD (GFR = 45-59 mL/min/1.73 square meters)    Stage 3B Moderate CKD (GFR = 30-44 mL/min/1.73 square meters)    Stage 4 Severe  CKD (GFR = 15-29 mL/min/1.73 square meters)    Stage 5 End Stage CKD (GFR <15 mL/min/1.73 square meters)  Note: GFR calculation is accurate only with a steady state creatinine    Urine Microscopic [[id number]]  (Abnormal) Collected: 01/02/25 0524    Lab Status: Final result Specimen: Urine, Clean Catch Updated: 01/02/25 0611     RBC, UA 1-2 /hpf      WBC, UA 4-10 /hpf      Epithelial Cells Occasional /hpf      Bacteria, UA Innumerable /hpf      MUCUS THREADS Occasional    UA w Reflex to Microscopic w Reflex to Culture [[id number]]  (Abnormal) Collected: 01/02/25 0524    Lab Status: Final result Specimen: Urine, Clean Catch Updated: 01/02/25 0554     Color, UA Light Yellow     Clarity, UA Clear     Specific Gravity, UA 1.014     pH, UA 6.0     Leukocytes, UA Trace     Nitrite, UA Negative     Protein, UA Negative mg/dl      Glucose, UA Negative mg/dl      Ketones, UA Negative mg/dl      Urobilinogen, UA <2.0 mg/dl      Bilirubin, UA Negative     Occult Blood, UA Negative    CBC and differential [[id number]]  (Abnormal) Collected: 01/02/25 0525    Lab Status: Final result Specimen: Blood from Arm, Right Updated: 01/02/25 0547     WBC 10.49 Thousand/uL      RBC 4.06 Million/uL      Hemoglobin 9.7 g/dL      Hematocrit 29.4 %      MCV 72 fL      MCH 23.9 pg      MCHC 33.0 g/dL      RDW 17.8 %      MPV 10.0 fL      Platelets 418 Thousands/uL      nRBC 0 /100 WBCs      Segmented % 81 %      Immature Grans % 0 %      Lymphocytes % 12 %      Monocytes % 7 %      Eosinophils Relative 0 %      Basophils Relative 0 %      Absolute Neutrophils 8.39 Thousands/µL      Absolute Immature Grans 0.03 Thousand/uL      Absolute Lymphocytes 1.27 Thousands/µL      Absolute Monocytes 0.72 Thousand/µL      Eosinophils Absolute 0.04 Thousand/µL      Basophils Absolute 0.04 Thousands/µL     POCT pregnancy, urine [[id number]]  (Abnormal) Collected: 01/02/25 0516    Lab Status: Final result Updated: 01/02/25 0517     EXT Preg Test, Ur Positive      Control Valid            US OB < 14 weeks with transvaginal   ED Interpretation by Georgina Pires MD (01/02 3881)   FINDINGS:     A single live intrauterine gestation is identified.  Cardiac activity is present. Heart rate of 128 bpm.     YOLK SAC: Present and normal in size and appearance.  MEAN GESTATIONAL SAC SIZE: 25.0 mm = 7w 1d  MEAN CROWN RUMP LENGTH: 7.0 mm = 6w 4d  FETAL ANATOMY: Appropriate for gestational age.  AMNIOTIC FLUID/SAC SHAPE: Within expected normal range.  PLACENTA: The placenta is appropriate for gestational age.     Small left lateral subchorionic collection measuring approximately 2 x 0.7 x 1 cm.     UTERUS/ADNEXA:  The uterus and ovaries are within normal limits. 2.5 cm left ovarian corpus luteum.  The cervix remains closed.  No free fluid present.     IMPRESSION:     Single live intrauterine gestation at 6 weeks 4 days (range +/- 4 days).     SIMONE of 08/24/2025.     Small left lateral subchorionic collection measuring approximately 2 x 0.7 x 1 cm.     The study was marked in EPIC for immediate notification.        Workstation performed: [id number]        Final Interpretation by Constantin Arndt MD (01/02 1758)      Single live intrauterine gestation at 6 weeks 4 days (range +/- 4 days).      SIMONE of 08/24/2025.      Small left lateral subchorionic collection measuring approximately 2 x 0.7 x 1 cm.      The study was marked in EPIC for immediate notification.         Workstation performed: [id number]             Procedures    ED Medication and Procedure Management   None     Discharge Medication List as of 1/2/2025  7:58 AM        START taking these medications    Details   cephalexin (KEFLEX) 500 mg capsule Take 1 capsule (500 mg total) by mouth 4 (four) times a day for 7 days, Starting Thu 1/2/2025, Until Thu 1/9/2025, Normal           No discharge procedures on file.  ED SEPSIS DOCUMENTATION   Time reflects when diagnosis was documented in both MDM as applicable and the  Disposition within this note       Time User Action Codes Description Comment    1/2/2025  6:45 AM Georgina Pires [N39.0] UTI (urinary tract infection)     1/2/2025  7:52 AM Georgina Pires [R10.2] Pelvic pain     1/2/2025  7:53 AM Georgina Pires [O26.891,  R10.2] Pelvic pain affecting pregnancy in first trimester, antepartum     1/2/2025  7:56 AM Georgina Pires Remove [R10.2] Pelvic pain                  Georgina Pires MD  01/02/25 1236

## 2025-01-04 NOTE — ED ATTENDING ATTESTATION
1/2/2025  IJimenez DO, saw and evaluated the patient. I have discussed the patient with the resident/non-physician practitioner and agree with the resident's/non-physician practitioner's findings, Plan of Care, and MDM as documented in the resident's/non-physician practitioner's note, except where noted. All available labs and Radiology studies were reviewed.  I was present for key portions of any procedure(s) performed by the resident/non-physician practitioner and I was immediately available to provide assistance.       At this point I agree with the current assessment done in the Emergency Department.  I have conducted an independent evaluation of this patient a history and physical is as follows:    24-year-old female coming into the ED for evaluation of lower abdominal/pelvic discomfort, she is approximately 6 weeks pregnant by last menstrual period dates.  Has not seen OB/GYN or had a pelvic ultrasound to confirm pregnancy yet.  She denies any vaginal bleeding.  No fevers or chills.    PE:  The patient is well appearing, non-toxic, in NAD. Head: normocephalic, atraumatic. HEENT: mucous membranes moist.  Lungs: CTA b/l, no resp distress. Heart: RRR. No M/R/G. Abdomen: suprapubic/pelvic tenderness present, ND, no R/R/G. Neuro: CN2-12 intact, GCS 15. Normal strength and sensation, normal speech and gait. Cap refill < 2 sec, skin warm and dry. No rashes or lesions.    ED eval - labs, UA, pelvic US - shows early IUP w/ cardiac activity at 6w6d gestation. + subchorionic hemorrhage seen.  Dx: early pregnancy w/ pelvic pain, possible threatened miscarriage. F/u with OB GYN outpt, RTEr precautions.      ED Course         Critical Care Time  Procedures

## 2025-01-22 ENCOUNTER — OFFICE VISIT (OUTPATIENT)
Dept: OBGYN CLINIC | Facility: CLINIC | Age: 25
End: 2025-01-22

## 2025-01-22 VITALS
BODY MASS INDEX: 24.63 KG/M2 | DIASTOLIC BLOOD PRESSURE: 72 MMHG | HEIGHT: 63 IN | SYSTOLIC BLOOD PRESSURE: 105 MMHG | HEART RATE: 105 BPM | WEIGHT: 139 LBS | RESPIRATION RATE: 18 BRPM

## 2025-01-22 DIAGNOSIS — R11.0 NAUSEA: ICD-10-CM

## 2025-01-22 DIAGNOSIS — Z3A.09 9 WEEKS GESTATION OF PREGNANCY: Primary | ICD-10-CM

## 2025-01-22 DIAGNOSIS — O21.9 NAUSEA AND VOMITING IN PREGNANCY: ICD-10-CM

## 2025-01-22 DIAGNOSIS — Z59.41 FOOD INSECURITY: ICD-10-CM

## 2025-01-22 DIAGNOSIS — Z59.9 FINANCIAL DIFFICULTIES: ICD-10-CM

## 2025-01-22 PROBLEM — Z20.2 POSSIBLE EXPOSURE TO STD: Status: RESOLVED | Noted: 2022-03-01 | Resolved: 2025-01-22

## 2025-01-22 PROCEDURE — 87591 N.GONORRHOEAE DNA AMP PROB: CPT

## 2025-01-22 PROCEDURE — 87491 CHLMYD TRACH DNA AMP PROBE: CPT

## 2025-01-22 PROCEDURE — G0145 SCR C/V CYTO,THINLAYER,RESCR: HCPCS

## 2025-01-22 RX ORDER — PYRIDOXINE HCL (VITAMIN B6) 50 MG
50 TABLET ORAL 2 TIMES DAILY PRN
Qty: 60 TABLET | Refills: 0 | Status: SHIPPED | OUTPATIENT
Start: 2025-01-22 | End: 2025-02-21

## 2025-01-22 SDOH — ECONOMIC STABILITY - INCOME SECURITY: PROBLEM RELATED TO HOUSING AND ECONOMIC CIRCUMSTANCES, UNSPECIFIED: Z59.9

## 2025-01-22 SDOH — ECONOMIC STABILITY - FOOD INSECURITY: FOOD INSECURITY: Z59.41

## 2025-01-22 NOTE — PATIENT INSTRUCTIONS
Recommend B6 and doxylamine for nausea. Counseled patient on doxylamine may make her sleepy.     Complete prenatal lab work.     Maternal fetal medicine will contact you to make an appointment.

## 2025-01-22 NOTE — ASSESSMENT & PLAN NOTE
Overview:  Labs  Pap smear collected 1/22; GC/CT collected 1/22  Prenatal panel ordered  28w labs pending [ ]  Vaccines:  Flu vaccine: []  Covid vaccine: []  Tdap vaccine: will offer at 28 weeks  RSV vaccine: will offer 32-36 weeks   Genetic screening []  Contraception: [ ]  Feeding plan: breast/bottle [ ]  Birth plan: [ ]  Delivery consent: to be signed at 28w []  Ultrasounds: 1/22- in office showed single intrauterine pregnancy, 160's bpm    Assessment and Plan:  Nausea with limited vomiting  Return to clinic in 4 weeks

## 2025-01-22 NOTE — PROGRESS NOTES
"OB/GYN  PRENATAL H&P VISIT  Serg Balbuena  2025  10:31 AM  Dr. Mary Johns MD      SUBJECTIVE  Patient is a  at 9w3d here for initial prenatal H&P. Last LMP 11/15 and cycles are regular. This is an unexpected but desired pregnancy. Patient is currently feeling nauseous, couple episodes of vomiting but tolerable. Also has some abdominal cramping. FOB is \"somewhat\" involved. This is her first pregnancy. She is currently unemployed. She has a support system at home. Recently went to ED on  for UTI and treated with keflex which she has completed. No dysuria or hematuria at this time. She denies hx of STD/STI, denies a hx of TB or close contacts with persons with TB. She denies  a family history of inheritable conditions such as physical disabilities, birth defects, blood disorders, heart or neural tube defects. States that her mother states that there is some intellectual disabilities in the family. She denies having MRSA. She denies recent travel or travel planned in the near future. She denies cigarette, alcohol or recreational drug use. She does admit to vaping with nicotine, smokes one cartridge a week.    She denies vaginal bleeding, cramping, leakage, abnormal discharge.     OB History    Para Term  AB Living   1 0 0 0 0 0   SAB IAB Ectopic Multiple Live Births   0 0 0 0 0      # Outcome Date GA Lbr Igor/2nd Weight Sex Type Anes PTL Lv   1 Current                Review of Systems   Constitutional:  Negative for chills and fever.   HENT:  Negative for ear pain and sore throat.    Eyes:  Negative for pain and visual disturbance.   Respiratory:  Negative for cough and shortness of breath.    Cardiovascular:  Negative for chest pain and palpitations.   Gastrointestinal:  Positive for abdominal pain and nausea. Negative for diarrhea and vomiting.   Genitourinary:  Negative for dysuria and hematuria.   Musculoskeletal:  Negative for arthralgias and back pain.   Skin:  Negative for color " change and rash.   Neurological:  Negative for seizures and syncope.   All other systems reviewed and are negative.      Past Medical History:   Diagnosis Date    Anemia     Anxiety 02/13/2015    Asthma     Constipation     Depression 08/6/2013    GERD (gastroesophageal reflux disease)     Irritable bowel syndrome     Lower abdominal pain     Nausea     Weight loss        Past Surgical History:   Procedure Laterality Date    NO PAST SURGERIES         Social History     Socioeconomic History    Marital status: Single     Spouse name: Not on file    Number of children: Not on file    Years of education: Not on file    Highest education level: Not on file   Occupational History    Not on file   Tobacco Use    Smoking status: Former     Current packs/day: 0.00     Average packs/day: 0.3 packs/day for 5.0 years (1.3 ttl pk-yrs)     Types: Cigars, Cigarettes     Start date: 2018     Quit date: 1/1/2022     Years since quitting: 3.0    Smokeless tobacco: Never   Vaping Use    Vaping status: Every Day    Substances: Nicotine, Flavoring   Substance and Sexual Activity    Alcohol use: Not Currently     Comment: socailly     Drug use: Not Currently    Sexual activity: Not Currently     Partners: Female, Male     Birth control/protection: None   Other Topics Concern    Not on file   Social History Narrative    Not on file     Social Drivers of Health     Financial Resource Strain: Medium Risk (1/22/2025)    Overall Financial Resource Strain (CARDIA)     Difficulty of Paying Living Expenses: Somewhat hard   Food Insecurity: Food Insecurity Present (1/22/2025)    Hunger Vital Sign     Worried About Running Out of Food in the Last Year: Sometimes true     Ran Out of Food in the Last Year: Sometimes true   Transportation Needs: No Transportation Needs (1/22/2025)    PRAPARE - Transportation     Lack of Transportation (Medical): No     Lack of Transportation (Non-Medical): No   Physical Activity: Inactive (3/1/2022)    Exercise  Vital Sign     Days of Exercise per Week: 0 days     Minutes of Exercise per Session: 0 min   Stress: No Stress Concern Present (3/1/2022)    [employer] - Occupational Stress Questionnaire     Feeling of Stress : Not at all   Social Connections: Socially Isolated (3/1/2022)    Social Connection and Isolation Panel [NHANES]     Frequency of Communication with Friends and Family: More than three times a week     Frequency of Social Gatherings with Friends and Family: More than three times a week     Attends Sikh Services: Never     Active Member of Clubs or Organizations: No     Attends Club or Organization Meetings: Never     Marital Status: Never    Intimate Partner Violence: Not At Risk (3/1/2022)    Humiliation, Afraid, Rape, and Kick questionnaire     Fear of Current or Ex-Partner: No     Emotionally Abused: No     Physically Abused: No     Sexually Abused: No   Housing Stability: Low Risk  (1/22/2025)    Housing Stability Vital Sign     Unable to Pay for Housing in the Last Year: No     Number of Times Moved in the Last Year: 0     Homeless in the Last Year: No       OBJECTIVE  Vitals:    01/22/25 0932   BP: 105/72   Pulse: 105   Resp: 18     Physical Exam  Constitutional:       Appearance: Normal appearance.   HENT:      Head: Normocephalic and atraumatic.      Mouth/Throat:      Mouth: Mucous membranes are moist.   Eyes:      General:         Right eye: No discharge.         Left eye: No discharge.      Conjunctiva/sclera: Conjunctivae normal.   Cardiovascular:      Rate and Rhythm: Normal rate and regular rhythm.      Pulses: Normal pulses.      Heart sounds: Normal heart sounds.   Pulmonary:      Effort: Pulmonary effort is normal. No respiratory distress.      Breath sounds: Normal breath sounds.   Abdominal:      General: Bowel sounds are normal.      Palpations: Abdomen is soft.      Tenderness: There is no abdominal tenderness. There is no right CVA tenderness or  "left CVA tenderness.   Genitourinary:     General: Normal vulva.      Vagina: Normal. No vaginal discharge.      Cervix: Normal.   Musculoskeletal:      Cervical back: Neck supple.      Right lower leg: No edema.      Left lower leg: No edema.   Skin:     General: Skin is warm and dry.      Capillary Refill: Capillary refill takes less than 2 seconds.   Neurological:      Mental Status: She is alert.   Psychiatric:         Mood and Affect: Mood normal.         Behavior: Behavior normal.         's by abdominal ultrasound    ASSESSMENT AND PLAN    24 y.o., , with /72 (BP Location: Left arm, Patient Position: Sitting, Cuff Size: Adult)   Pulse 105   Resp 18   Ht 5' 3\" (1.6 m)   Wt 63 kg (139 lb)   LMP 2024 (Exact Date) , at  9w3d here for her prenatal H&P.    Problem List Items Addressed This Visit          Digestive    Nausea and vomiting in pregnancy    Reports nausea with minimal vomiting which began around 6 weeks or so. Has not tried anything for nausea.     Recommended B6 and doxylamine BID PRN. Counseled pt that doxylamine may make her sleepy  Counseled patient on eating frequently   Maintain hydration   Will inform us if vomiting worsens, can trial zofran then            Care Coordination    Food insecurity    Placed referral for .          Relevant Orders    Ambulatory referral to social work care management program    Financial difficulties    Currently uninsured. Discussed with financial aid today after visit.          Relevant Orders    Ambulatory referral to social work care management program       Obstetrics/Gynecology    9 weeks gestation of pregnancy - Primary    Relevant Orders    HIV 1/2 AG/AB w Reflex [facility] for 2 yr old and above    Hepatitis C antibody    UA (URINE) with reflex to Scope    Urine culture    Hepatitis B surface antigen    CBC and differential    Rubella antibody, IgG    Type and screen    RPR-Syphilis Screening (Total Syphilis IGG/IGM)    " Hepatitis B surface antibody    Anemia Panel w/Reflex, OB    Liquid-based pap, screening    Chlamydia/GC amplified DNA by PCR     Other Visit Diagnoses         Nausea        Relevant Medications    pyridoxine (VITAMIN B6) 50 mg tablet    doxylamine (UNISOM) 25 MG tablet               - Pregnancy: H&P completed today. PN Labs ordered.  Labor expectations discussed with patient, including appointment schedule, nutrition, weight gain, exercise, medications, sexual intercourse, and nausea/vomiting.   - Screening: Pap smear completed today. GC/CT collected 1/22. Referral placed for MFM.   - Follow up: RTC in 4 weeks. Precautions regarding labor, leakage, bleeding, and fetal movement reviewed.    D/w  [employer]    Mary Johns MD  1/22/2025  10:31 AM

## 2025-01-22 NOTE — ASSESSMENT & PLAN NOTE
Reports nausea with minimal vomiting which began around 6 weeks or so. Has not tried anything for nausea.     Recommended B6 and doxylamine BID PRN. Counseled pt that doxylamine may make her sleepy  Counseled patient on eating frequently   Maintain hydration   Will inform us if vomiting worsens, can trial zofran then

## 2025-01-23 LAB
C TRACH DNA SPEC QL NAA+PROBE: NEGATIVE
N GONORRHOEA DNA SPEC QL NAA+PROBE: NEGATIVE

## 2025-01-24 ENCOUNTER — RESULTS FOLLOW-UP (OUTPATIENT)
Age: 25
End: 2025-01-24

## 2025-01-27 ENCOUNTER — PATIENT OUTREACH (OUTPATIENT)
Dept: OBGYN CLINIC | Facility: CLINIC | Age: 25
End: 2025-01-27

## 2025-01-27 LAB
LAB AP GYN PRIMARY INTERPRETATION: NORMAL
Lab: NORMAL

## 2025-01-27 NOTE — PROGRESS NOTES
CELESTINO BENNETT attempted to contact pt x2. Pt did not answer. CELESTINO BENNETT left a voicemail for a return call and will try again at schedule intake appointment.

## 2025-01-30 ENCOUNTER — PATIENT OUTREACH (OUTPATIENT)
Dept: OBGYN CLINIC | Facility: CLINIC | Age: 25
End: 2025-01-30

## 2025-01-30 NOTE — PROGRESS NOTES
CELESTINO BENNETT contacted pt for pn assessment. CELESTINO BENNETT introduced self to the pt and the purpose of the call. Pt is currently busy.CELESTINO BENNETT and pt scheduled to meet at next appointment.

## 2025-02-05 ENCOUNTER — PATIENT OUTREACH (OUTPATIENT)
Dept: OBGYN CLINIC | Facility: CLINIC | Age: 25
End: 2025-02-05

## 2025-02-05 ENCOUNTER — INITIAL PRENATAL (OUTPATIENT)
Dept: OBGYN CLINIC | Facility: CLINIC | Age: 25
End: 2025-02-05

## 2025-02-05 DIAGNOSIS — Z13.31 POSITIVE DEPRESSION SCREENING: ICD-10-CM

## 2025-02-05 DIAGNOSIS — Z59.41 FOOD INSECURITY: ICD-10-CM

## 2025-02-05 DIAGNOSIS — Z59.86 FINANCIAL INSECURITY: ICD-10-CM

## 2025-02-05 DIAGNOSIS — Z34.91 PRENATAL CARE IN FIRST TRIMESTER: Primary | ICD-10-CM

## 2025-02-05 PROCEDURE — 99211 OFF/OP EST MAY X REQ PHY/QHP: CPT

## 2025-02-05 SDOH — ECONOMIC STABILITY - INCOME SECURITY: FINANCIAL INSECURITY: Z59.86

## 2025-02-05 SDOH — ECONOMIC STABILITY - FOOD INSECURITY: FOOD INSECURITY: Z59.41

## 2025-02-05 NOTE — LETTER
Work Letter    02/05/25      Serg Balbuena  2000  [address] [place] [id number]    Dear Serg Balbuena,      Your employee is a patient at [facility] .    We recommend that all pregnant women:    1. Have a well-ventilated workspace.  2. Wear low-heeled shoes.  3. Work no more than 40 hours per week.  4. Have a 15 minute break every 2 hours and at least 30 minutes for a meal break.   5. Use good body mechanics by bending at your knees to avoid back strain and lift no more than 20 pounds without assistance. Will need assistance with lifting over 20 lbs.   6. Have ready access to bathrooms and water.      She may continue to work until her due date unless medical complications arise. We anticipate she may return to work in 6-8 weeks after delivery.     Sincerely,  [facility]  [address], [address]  [place]  [address]  OFFICE:  [phone]    OR  [address] [place] [address]  OFFICE:  [phone]

## 2025-02-05 NOTE — PROGRESS NOTES
CELESTINO BENNETT was referred to complete a PN SW assessment. Pt is 24 y.o.  single  female. Pt GA is 11w3d and  Estimated Date of Delivery: 8/24/25. Pt primary language is English.     Pt reports this to be an unplanned but welcomed pregnancy. Pt reports that FOB is involved. Pt is living with her brother and reports to have her siblings and family support. Pt is feeling excited. Pt is unsure at this time if she will have a baby shower but her sister would like her to have one. Pt is currently not working but denies food insecurity due to SNAP and WIC. Pt has a car and denies transportation concern. Pt has a hx of depression denies need for therapy services. Pt denies SI and SIB. Pt reports that she still vapes but is currently trying to fully quit. A referral for smoking cessation program has been submitted by DAVIDSON Blanchard.      CELESTINO BENNETT provided pt her contact card for additional support if needed. CELESTINO BENNETT will otherwise close this referral at this time.

## 2025-02-05 NOTE — PROGRESS NOTES
OB INTAKE INTERVIEW  Pt presents for OB intake.     OB History    Para Term  AB Living   1        SAB IAB Ectopic Multiple Live Births             # Outcome Date GA Lbr Igor/2nd Weight Sex Type Anes PTL Lv   1 Current              Hx of  delivery prior to 36 weeks 6 days:  N/A   If yes, place a referral for cervical surveillance at 16 weeks.   Last Menstrual Period:    Patient's last menstrual period was 2024 (exact date).     Ultrasound date: 2025  6 weeks 4 days via ED     Estimated Date of Delivery: 25   by US  H&P visit scheduled. 2025 @ 1030  with Dr. Andreas MD     Last pap smear: 2025  Findings; lab pap smear results: no abnormalities    Current Issues:  Constipation :   Yes, denied blood. Educated pt on dietary changes   Headaches :   Yes, relieved on their own   Cramping:  Yes  Spotting :   No  PICA cravings :  Yes, ice chips   FOB Involved:   Yes  Planned pregnancy:  No  I have these concerns about this prenatal patient:   Nicotine use in pregnancy - referral ordered for Smoking Cessation   Referral ordered for MFM, SWCM, NFP, Dentistry and Family Practice   SDOH  concerns - referral ordered for SWCM       Interview education  [facility] Pregnancy Essentials reviewed and discussed   [facility] Handout  [facility] M Handout  Discussed genetic testing  Prenatal lab work: Scripts printed and given to pt.   Influenza vaccine given today: No, pt is not interested   Discussed Tdap vaccine.   Immunizations:   Immunization History   Administered Date(s) Administered    DTaP 2001, 2001, 2002, 2002, 2005    HPV Quadrivalent 2012, 2014    HPV9 2015    Hep B, Adolescent or Pediatric 2001, 2001, 2002    Hepatitis A 2006, 10/21/2008    HiB 2001, 2001, 2002, 2002    INFLUENZA 10/21/2008, 2012    IPV 2001, 2002, 2002, 2005     Influenza, seasonal, injectable 11/11/2015    MMR 06/11/2002, 08/25/2005    Meningococcal MCV4P 03/23/2012    Meningococcal Polysaccharide (MPSV4) 04/12/2018    Pneumococcal Conjugate PCV 7 03/20/2001, 05/02/2001, 01/09/2002    Tdap 03/23/2012, 07/08/2022    Tuberculin Skin Test-PPD Intradermal 08/16/2004    Varicella 01/08/2002, 07/14/2008     Depression Screening Follow-up Plan: Patient's depression screening was positive with an [place] score of   19  Patient assessed for underlying major depression. They have no active suicidal ideations. Brief counseling provided and recommend additional follow-up/re-evaluation next office visit. Patient advised to follow-up with PCP for further management..  Nurse/Family Partnership- referral placed:  Yes   If yes, place referral for nurse family partnership  BMI Counseling: There is no height or weight on file to calculate BMI.   Tobacco Cessation Counseling: current   Tobacco cessation counseling and education was provided. The patient is sincerely urged to quit consumption of tobacco. She is ready to quit tobacco. The numerous health risks of tobacco consumption were discussed. Patient was provided a referral for tobacco cessation management..  Infection Screening: Does the pt have a hx of MRSA? No  If yes- please follow MRSA protocol and obtain a nasal swab for MRSA culture  The patient was oriented to our practice and all questions were answered.  Interviewed by: Apoorva Blanchard RN 02/05/25

## 2025-02-05 NOTE — LETTER
Proof of Pregnancy Letter      02/05/25            Serg Balbuena  2000  [address] [place] [id number]      Serg Balbuena is a patient at our facility. Serg Balbuena Estimated Date of Delivery: 8/24/25       Any questions or concerns, please feel free to contact our office.      Sincerely,      [facility]  [address] [place] [address], [address]  [place] [address]  Office:  [phone]

## 2025-02-05 NOTE — LETTER
[facility] Letter    02/05/25      Serg Balbuena  2000  [facility] [facility] [id number]       Serg Balbuena is a patient and under our care in our office. Serg Balbuena's Estimated Date of Delivery: 8/24/25.  Any questions or concerns feel free to contact our office.       Respectfully,    [facility] Care Team      [facility] Betty/[place]  [phone]  [facility]/[place]  [phone]    [facility]/[place]my  [phone]   [facility]  [phone]    [facility]   [phone]    [facility]  [phone]

## 2025-02-05 NOTE — LETTER
Dentist Letter            02/05/25          Sreg Balbuena  2000  [facility] [facility] [id number]               We have had several requests from local dentist requesting permission to perform procedures on our patients who are pregnant. We wish to respond with this letter regarding some of the more routine procedures that we have been asked about.    The following procedures may be performed on our obstetric patients:   1. Administration of local anesthesia   2. Administration of antibiotics such as PCN, Ampicillin, and Erythromycin.   3. Administration of pain medications such as Tylenol, Tylenol with codeine, and if needed Percocet.   4. Shielded X-rays    Should you have any questions, please do not hesitate to contact at [phone].        Sincerely,    [facility]  [phone]

## 2025-02-11 ENCOUNTER — APPOINTMENT (OUTPATIENT)
Dept: LAB | Facility: CLINIC | Age: 25
End: 2025-02-11

## 2025-02-11 ENCOUNTER — ROUTINE PRENATAL (OUTPATIENT)
Dept: OBGYN CLINIC | Facility: CLINIC | Age: 25
End: 2025-02-11

## 2025-02-11 VITALS
RESPIRATION RATE: 18 BRPM | DIASTOLIC BLOOD PRESSURE: 64 MMHG | WEIGHT: 143 LBS | HEART RATE: 92 BPM | SYSTOLIC BLOOD PRESSURE: 98 MMHG | HEIGHT: 63 IN | BODY MASS INDEX: 25.34 KG/M2

## 2025-02-11 DIAGNOSIS — Z3A.12 12 WEEKS GESTATION OF PREGNANCY: Primary | ICD-10-CM

## 2025-02-11 DIAGNOSIS — Z3A.09 9 WEEKS GESTATION OF PREGNANCY: ICD-10-CM

## 2025-02-11 LAB
ABO GROUP BLD: NORMAL
AMORPH URATE CRY URNS QL MICRO: ABNORMAL
BACTERIA UR QL AUTO: ABNORMAL /HPF
BASOPHILS # BLD AUTO: 0.03 THOUSANDS/ÂΜL (ref 0–0.1)
BASOPHILS NFR BLD AUTO: 0 % (ref 0–1)
BILIRUB UR QL STRIP: NEGATIVE
BLD GP AB SCN SERPL QL: NEGATIVE
CLARITY UR: ABNORMAL
COLOR UR: ABNORMAL
EOSINOPHIL # BLD AUTO: 0.06 THOUSAND/ÂΜL (ref 0–0.61)
EOSINOPHIL NFR BLD AUTO: 1 % (ref 0–6)
ERYTHROCYTE [DISTWIDTH] IN BLOOD BY AUTOMATED COUNT: 17.8 % (ref 11.6–15.1)
FERRITIN SERPL-MCNC: 3 NG/ML (ref 11–307)
GLUCOSE UR STRIP-MCNC: NEGATIVE MG/DL
HCT VFR BLD AUTO: 29.9 % (ref 34.8–46.1)
HGB BLD-MCNC: 9.8 G/DL (ref 11.5–15.4)
HGB UR QL STRIP.AUTO: NEGATIVE
IMM GRANULOCYTES # BLD AUTO: 0.03 THOUSAND/UL (ref 0–0.2)
IMM GRANULOCYTES NFR BLD AUTO: 0 % (ref 0–2)
KETONES UR STRIP-MCNC: NEGATIVE MG/DL
LEUKOCYTE ESTERASE UR QL STRIP: ABNORMAL
LYMPHOCYTES # BLD AUTO: 1.87 THOUSANDS/ÂΜL (ref 0.6–4.47)
LYMPHOCYTES NFR BLD AUTO: 17 % (ref 14–44)
MCH RBC QN AUTO: 24.1 PG (ref 26.8–34.3)
MCHC RBC AUTO-ENTMCNC: 32.8 G/DL (ref 31.4–37.4)
MCV RBC AUTO: 74 FL (ref 82–98)
MONOCYTES # BLD AUTO: 0.57 THOUSAND/ÂΜL (ref 0.17–1.22)
MONOCYTES NFR BLD AUTO: 5 % (ref 4–12)
MUCOUS THREADS UR QL AUTO: ABNORMAL
NEUTROPHILS # BLD AUTO: 8.39 THOUSANDS/ÂΜL (ref 1.85–7.62)
NEUTS SEG NFR BLD AUTO: 77 % (ref 43–75)
NITRITE UR QL STRIP: NEGATIVE
NON-SQ EPI CELLS URNS QL MICRO: ABNORMAL /HPF
NRBC BLD AUTO-RTO: 0 /100 WBCS
PH UR STRIP.AUTO: 7 [PH]
PLATELET # BLD AUTO: 471 THOUSANDS/UL (ref 149–390)
PMV BLD AUTO: 10.4 FL (ref 8.9–12.7)
PROT UR STRIP-MCNC: ABNORMAL MG/DL
RBC # BLD AUTO: 4.07 MILLION/UL (ref 3.81–5.12)
RBC #/AREA URNS AUTO: ABNORMAL /HPF
RH BLD: POSITIVE
RUBV IGG SERPL IA-ACNC: 43.4 IU/ML
SP GR UR STRIP.AUTO: 1.01 (ref 1–1.03)
SPECIMEN EXPIRATION DATE: NORMAL
UROBILINOGEN UR STRIP-ACNC: <2 MG/DL
WBC # BLD AUTO: 10.95 THOUSAND/UL (ref 4.31–10.16)
WBC #/AREA URNS AUTO: ABNORMAL /HPF

## 2025-02-11 PROCEDURE — 85025 COMPLETE CBC W/AUTO DIFF WBC: CPT

## 2025-02-11 PROCEDURE — 86762 RUBELLA ANTIBODY: CPT

## 2025-02-11 PROCEDURE — 36415 COLL VENOUS BLD VENIPUNCTURE: CPT

## 2025-02-11 PROCEDURE — 99213 OFFICE O/P EST LOW 20 MIN: CPT | Performed by: OBSTETRICS & GYNECOLOGY

## 2025-02-11 PROCEDURE — 87086 URINE CULTURE/COLONY COUNT: CPT

## 2025-02-11 PROCEDURE — 86706 HEP B SURFACE ANTIBODY: CPT

## 2025-02-11 PROCEDURE — 86850 RBC ANTIBODY SCREEN: CPT

## 2025-02-11 PROCEDURE — 82728 ASSAY OF FERRITIN: CPT

## 2025-02-11 PROCEDURE — 81001 URINALYSIS AUTO W/SCOPE: CPT

## 2025-02-11 PROCEDURE — 86901 BLOOD TYPING SEROLOGIC RH(D): CPT

## 2025-02-11 PROCEDURE — 86803 HEPATITIS C AB TEST: CPT

## 2025-02-11 PROCEDURE — 86780 TREPONEMA PALLIDUM: CPT

## 2025-02-11 PROCEDURE — 87389 HIV-1 AG W/HIV-1&-2 AB AG IA: CPT

## 2025-02-11 PROCEDURE — 86900 BLOOD TYPING SEROLOGIC ABO: CPT

## 2025-02-11 PROCEDURE — 87340 HEPATITIS B SURFACE AG IA: CPT

## 2025-02-11 NOTE — PROGRESS NOTES
[facility]   PRENATAL VISIT  Serg Balbuena  [id number]  2000        A/P:    Problem List       Menorrhagia with regular cycle    Anemia    Irritable bowel syndrome    Asthma    12 weeks gestation of pregnancy    Overview   Overview:  Labs  Pap smear NILM; GC/CT neg  Prenatal panel pending  28w labs pending [ ]  Medication  PNV  B6 & doxylamine BID PRN for nausea/vomiting   Vaccines:  Flu vaccine: declines  Covid vaccine: did not receive before, does not want to  Tdap vaccine: will offer at 28 weeks  RSV vaccine: will offer 32-36 weeks if during season  Genetic screening: MFM appt for NT on 25  Contraception: POPs  Feeding plan: breastfeeding  Birth plan:  with epidural  Delivery consent: to be signed at 28w []  Ultrasounds: 25 with MFM for Level I         Nausea and vomiting in pregnancy    Food insecurity    Financial difficulties          S: 24 y.o.  12w2d here for PN visit. She has no contractions. She has no leakage of fluid and vaginal bleeding. She has good fetal movement.     O:  Vitals:    25 1255   BP: 98/64   Pulse: 92   Resp: 18     Physical Exam  Constitutional:       Appearance: She is normal weight.   HENT:      Head: Normocephalic.   Eyes:      Conjunctiva/sclera: Conjunctivae normal.   Cardiovascular:      Pulses: Normal pulses.   Pulmonary:      Effort: Pulmonary effort is normal.   Abdominal:      Palpations: Abdomen is soft.      Tenderness: There is no abdominal tenderness.   Skin:     General: Skin is warm.      Capillary Refill: Capillary refill takes less than 2 seconds.   Neurological:      Mental Status: She is alert and oriented to person, place, and time.   Psychiatric:         Mood and Affect: Mood normal.         Fetal Heart Rate: 153       Amadou Delgado MD  PGY-4  2025  1:16 PM

## 2025-02-12 LAB
BACTERIA UR CULT: NORMAL
HBV SURFACE AB SER-ACNC: 3.41 MIU/ML
HBV SURFACE AG SER QL: NORMAL
HCV AB SER QL: NORMAL
HIV 1+2 AB+HIV1 P24 AG SERPL QL IA: NORMAL
TREPONEMA PALLIDUM IGG+IGM AB [PRESENCE] IN SERUM OR PLASMA BY IMMUNOASSAY: NORMAL

## 2025-02-17 PROBLEM — Z3A.13 13 WEEKS GESTATION OF PREGNANCY: Status: ACTIVE | Noted: 2025-01-22

## 2025-02-17 PROBLEM — O99.331: Status: ACTIVE | Noted: 2025-02-17

## 2025-02-20 ENCOUNTER — ROUTINE PRENATAL (OUTPATIENT)
Dept: PERINATAL CARE | Facility: CLINIC | Age: 25
End: 2025-02-20

## 2025-02-20 VITALS
BODY MASS INDEX: 25.16 KG/M2 | DIASTOLIC BLOOD PRESSURE: 60 MMHG | HEIGHT: 64 IN | WEIGHT: 147.4 LBS | HEART RATE: 93 BPM | SYSTOLIC BLOOD PRESSURE: 98 MMHG

## 2025-02-20 DIAGNOSIS — Z87.891 CESSATION OF TOBACCO USE IN PREVIOUS 12 MONTHS: ICD-10-CM

## 2025-02-20 DIAGNOSIS — Z34.01 PRIMIGRAVIDA IN FIRST TRIMESTER: ICD-10-CM

## 2025-02-20 DIAGNOSIS — Z34.91 PRENATAL CARE IN FIRST TRIMESTER: ICD-10-CM

## 2025-02-20 DIAGNOSIS — O99.011 ANEMIA DURING PREGNANCY IN FIRST TRIMESTER: ICD-10-CM

## 2025-02-20 DIAGNOSIS — Z36.82 ENCOUNTER FOR (NT) NUCHAL TRANSLUCENCY SCAN: ICD-10-CM

## 2025-02-20 DIAGNOSIS — Z3A.13 13 WEEKS GESTATION OF PREGNANCY: ICD-10-CM

## 2025-02-20 DIAGNOSIS — Z36.0 ENCOUNTER FOR ANTENATAL SCREENING FOR CHROMOSOMAL ANOMALIES: Primary | ICD-10-CM

## 2025-02-20 PROCEDURE — 99243 OFF/OP CNSLTJ NEW/EST LOW 30: CPT | Performed by: PHYSICIAN ASSISTANT

## 2025-02-20 PROCEDURE — 36415 COLL VENOUS BLD VENIPUNCTURE: CPT | Performed by: STUDENT IN AN ORGANIZED HEALTH CARE EDUCATION/TRAINING PROGRAM

## 2025-02-20 PROCEDURE — 76813 OB US NUCHAL MEAS 1 GEST: CPT | Performed by: STUDENT IN AN ORGANIZED HEALTH CARE EDUCATION/TRAINING PROGRAM

## 2025-02-20 RX ORDER — ASPIRIN 81 MG/1
162 TABLET ORAL DAILY
Qty: 180 TABLET | Refills: 2 | Status: SHIPPED | OUTPATIENT
Start: 2025-02-20

## 2025-02-20 NOTE — PROGRESS NOTES
This patient received  care under my supervision on 25 at 13w4d gestational age at [facility].  The note is contained in the ultrasound report located under OB Procedures tab in Epic.  Please call our office at [phone] with questions.    The ultrasound was interpreted by myself (LEONILA Rodriguez). The patient was counseled by MATILDE Gomez.      -Kim Rodriguez MD

## 2025-02-20 NOTE — LETTER
2025     Malaika Harris MD  [address]  [address]  [place] [address]    Patient: Serg Balbuena   YOB: 2000   Date of Visit: 2025       Dear Dr. Harris:    Thank you for referring Serg Balbuena to me for evaluation. Below are my notes for this consultation.    If you have questions, please do not hesitate to call me. I look forward to following your patient along with you.         Sincerely,        Kim Rodriguez MD        CC: No Recipients    Kim Rodriguez MD  2025  1:25 PM  Sign when Signing Visit  This patient received  care under my supervision on 25 at 13w4d gestational age at [facility].  The note is contained in the ultrasound report located under OB Procedures tab in Epic.  Please call our office at [phone] with questions.    The ultrasound was interpreted by myself (LEONILA Rodriguez). The patient was counseled by MATILDE Gomez.      -Kim Rodriguez MD

## 2025-02-20 NOTE — PATIENT INSTRUCTIONS
You elected to have non-invasive prenatal screening (NIPS). This involves a blood test to check for the four most common genetic syndromes (Trisomy 21, Trisomy 13, Trisomy 18, and sex chromosome abnormalities). It also MAY report the biologic sex of the fetus if you opted to learn this information. You can call our office to verbally review results to avoid inadvertently learning this information via [employer], if desired. Results will be visible in your [facility] portal 5-7 business days from when the test is drawn. Please follow all instructions regarding insurance cost/coverage provided to you today. Please contact our office with any concerns or questions. You will need spina bifida screening (called MSAFP) for the baby beginning at 15 weeks gestation, which will be ordered by your obstetrician's office. This test allows for earlier detection of spina bifida than is possible by ultrasound, and is advised in all pregnancies.          Thank you for choosing us for your  care today.  If you have any questions about your ultrasound or care, please do not hesitate to contact us or your primary obstetrician.        Some general instructions for your pregnancy are:    Exercise: Aim for 150 minutes per week of regular exercise.  Walking is great!  Nutrition: Choose healthy sources of calcium, iron, and protein.  Avoid ultraprocessed foods and added sugar.  Learn about Preeclampsia: preeclampsia is a common, potentially serious high blood pressure complication in pregnancy.  A blood pressure of 140mmHg (systolic or top number) or 90mmHg (diastolic or bottom number) should be evaluated by your doctor.  Aspirin is sometimes prescribed in early pregnancy to prevent preeclampsia in women with risk factors - ask your obstetrician if you should be on this medication.  For more resources, visit:  [email or web address].[email or web address]  If you smoke, please try to quit completely but also try to reduce your  smoking by as much as possible (as soon as possible).  Do not vape.  Please also avoid cannabis products.  Other warning signs to watch out for in pregnancy or postpartum: chest pain, obstructed breathing or shortness of breath, seizures, thoughts of hurting yourself or your baby, bleeding, a painful or swollen leg, fever, or headache (see [facility] POST-BIRTH Warning Signs campaign).  If these happen call 911.  Itching is also not normal in pregnancy and if you experience this, especially over your hands and feet, potentially worse at night, notify your doctors.

## 2025-02-20 NOTE — PROGRESS NOTES
Patient chose to have [facility] [id number] Non-Invasive Prenatal Screen [id number] PLUS w/ SCA, WITH fetal sex.  Patient choose [facility]'s self-pay option of $299.     Patient given brochure and is aware [facility] will contact patient's insurance and coordinate coverage.  Provided [facility] contact information. General inquiries [phone], Cost estimates [phone] and [facility] Billing [phone]. Website [email or web address].     Blood collection tubes labeled with patient identifiers (name, medical record number, and date of birth).     Filled out [facility] order form. Patient chose to have blood drawn in our office at time of visit. NIPS was drawn from right arm with a butterfly needle by Anny NICE .      If patient chose to have blood work drawn at a [facility] lab we requested patient notify MFM (via phone call or [employer] message) when blood collected so office can follow up on results.       Maternal Fetal Medicine will have results in approximately 5-7 business days and will call patient or notify via [employer].  Patient aware viewing lab result online will reveal fetal sex if ordered.    Patient verbalized understanding of all instructions and no questions at this time.

## 2025-02-24 LAB
CFDNA.FET/CFDNA.TOTAL SFR FETUS: NORMAL %
CITATION REF LAB TEST: NORMAL
FET 13+18+21+X+Y ANEUP PLAS.CFDNA: NEGATIVE
FET CHR 21 TS PLAS.CFDNA QL: NEGATIVE
FET CHR 21 TS PLAS.CFDNA QL: NEGATIVE
FET MS X RISK WBC.DNA+CFDNA QL: NOT DETECTED
FET SEX PLAS.CFDNA DOSAGE CFDNA: NORMAL
FET TS 13 RISK PLAS.CFDNA QL: NEGATIVE
FET X + Y ANEUP RISK PLAS.CFDNA SEQ-IMP: NOT DETECTED
GA EST FROM CONCEPTION DATE: NORMAL D
GESTATIONAL AGE > 9:: YES
LAB DIRECTOR NAME PROVIDER: NORMAL
LAB DIRECTOR NAME PROVIDER: NORMAL
LABORATORY COMMENT REPORT: NORMAL
LIMITATIONS OF THE TEST: NORMAL
NEGATIVE PREDICTIVE VALUE: NORMAL
PERFORMANCE CHARACTERISTICS: NORMAL
POSITIVE PREDICTIVE VALUE: NORMAL
REF LAB TEST METHOD: NORMAL
SL AMB NOTE:: NORMAL
TEST PERFORMANCE INFO SPEC: NORMAL

## 2025-02-25 ENCOUNTER — RESULTS FOLLOW-UP (OUTPATIENT)
Dept: PERINATAL CARE | Facility: CLINIC | Age: 25
End: 2025-02-25

## 2025-03-11 ENCOUNTER — ROUTINE PRENATAL (OUTPATIENT)
Dept: OBGYN CLINIC | Facility: CLINIC | Age: 25
End: 2025-03-11

## 2025-03-11 VITALS
HEART RATE: 106 BPM | HEIGHT: 64 IN | WEIGHT: 151 LBS | BODY MASS INDEX: 25.78 KG/M2 | DIASTOLIC BLOOD PRESSURE: 75 MMHG | SYSTOLIC BLOOD PRESSURE: 111 MMHG

## 2025-03-11 DIAGNOSIS — Z3A.16 16 WEEKS GESTATION OF PREGNANCY: ICD-10-CM

## 2025-03-11 DIAGNOSIS — Z34.92 PRENATAL CARE IN SECOND TRIMESTER: Primary | ICD-10-CM

## 2025-03-11 DIAGNOSIS — D50.9 IRON DEFICIENCY ANEMIA, UNSPECIFIED IRON DEFICIENCY ANEMIA TYPE: ICD-10-CM

## 2025-03-11 PROCEDURE — 99213 OFFICE O/P EST LOW 20 MIN: CPT | Performed by: OBSTETRICS & GYNECOLOGY

## 2025-03-11 RX ORDER — FERROUS SULFATE 324(65)MG
324 TABLET, DELAYED RELEASE (ENTERIC COATED) ORAL
Qty: 30 TABLET | Refills: 2 | Status: SHIPPED | OUTPATIENT
Start: 2025-03-11

## 2025-03-11 NOTE — PROGRESS NOTES
OB/GYN Prenatal Visit    SUBJECTIVE:  Serg Balbuena is a 24 y.o.  at 16w2d here for prenatal visit.  She has no obstetric complaints and denies pelvic pain, cramping/contractions, vaginal bleeding, loss of fluid, or decreased fetal movement. She feels stretching but nothing painful. She denies CP, SOB, palpitations, dizziness.     OBJECTIVE:  Vitals:    25 1332   BP: 111/75   Pulse: (!) 106     FHT: 137-151 bpm  Fundal height: 14 cm  SVE: deferred    Physical Exam  Constitutional:       General: She is not in acute distress.     Appearance: Normal appearance. She is not ill-appearing.   HENT:      Head: Normocephalic and atraumatic.      Mouth/Throat:      Mouth: Mucous membranes are moist. No oral lesions.   Eyes:      General: No scleral icterus.     Extraocular Movements: Extraocular movements intact.   Cardiovascular:      Rate and Rhythm: Regular rhythm. Tachycardia present.   Pulmonary:      Effort: Pulmonary effort is normal. No respiratory distress.   Musculoskeletal:      Right lower leg: No edema.      Left lower leg: No edema.   Neurological:      General: No focal deficit present.      Mental Status: She is alert and oriented to person, place, and time.   Skin:     General: Skin is warm and dry.   Psychiatric:         Attention and Perception: Attention normal.         Mood and Affect: Mood normal.         Speech: Speech normal.         Behavior: Behavior normal.         Thought Content: Thought content normal.         Judgment: Judgment normal.           ASSESSMENT / PLAN:    Problem List       Menorrhagia with regular cycle    Iron deficiency anemia    Current Assessment & Plan   PN labs with hgb 9.8  H/o anemia   Ferritin 3  Oral iron ordered  Miralax PRN for constipation          Irritable bowel syndrome    Asthma    16 weeks gestation of pregnancy    Overview   Overview:  Labs  Pap smear NILM; GC/CT neg  Prenatal panel wnl other than ADAN  28w labs pending [ ]  Medication  PNV  B6 &  doxylamine BID PRN for nausea/vomiting   Vaccines:  Flu vaccine: declines  Covid vaccine: did not receive before, does not want to  Tdap vaccine: will offer at 28 weeks  RSV vaccine: will offer 32-36 weeks if during season  Genetic screening: JON appt for NT on 25  Contraception: POPs  Feeding plan: breastfeeding  Birth plan:  with epidural  Delivery consent: to be signed at 28w []  Ultrasounds: 25 wnl, level 2 scheduled 25         Current Assessment & Plan   Normal prenatal visit  Iron ordered for ADAN. Instructed to take in AM on empty stomach with orange juice  MSAFP ordered  Reviewed normal weight gain in pregnancy   Reviewed  labor precautions         Nausea and vomiting in pregnancy    Food insecurity    Financial difficulties    Tobacco use during pregnancy in first trimester     Other Visit Diagnoses         Prenatal care in second trimester    -  Primary                Padmini Pugh MD  3/11/2025  1:54 PM

## 2025-03-11 NOTE — ASSESSMENT & PLAN NOTE
Normal prenatal visit  Iron ordered for ADAN. Instructed to take in AM on empty stomach with orange juice  MSAFP ordered  Reviewed normal weight gain in pregnancy   Reviewed  labor precautions

## 2025-03-13 ENCOUNTER — APPOINTMENT (OUTPATIENT)
Dept: LAB | Facility: CLINIC | Age: 25
End: 2025-03-13

## 2025-03-13 DIAGNOSIS — Z3A.13 13 WEEKS GESTATION OF PREGNANCY: ICD-10-CM

## 2025-03-13 DIAGNOSIS — Z34.92 PRENATAL CARE IN SECOND TRIMESTER: ICD-10-CM

## 2025-03-13 DIAGNOSIS — O99.011 ANEMIA DURING PREGNANCY IN FIRST TRIMESTER: ICD-10-CM

## 2025-03-13 PROCEDURE — 36415 COLL VENOUS BLD VENIPUNCTURE: CPT

## 2025-03-13 PROCEDURE — 82105 ALPHA-FETOPROTEIN SERUM: CPT

## 2025-03-16 LAB
2ND TRIMESTER 4 SCREEN SERPL-IMP: NORMAL
AFP ADJ MOM SERPL: 0.39
AFP INTERP AMN-IMP: NORMAL
AFP INTERP SERPL-IMP: NORMAL
AFP INTERP SERPL-IMP: NORMAL
AFP SERPL-MCNC: 15.4 NG/ML
AGE AT DELIVERY: 24.6 YR
GA METHOD: NORMAL
GA: 16.6 WEEKS
IDDM PATIENT QL: NO
MULTIPLE PREGNANCY: NO
NEURAL TUBE DEFECT RISK FETUS: NORMAL %

## 2025-03-20 ENCOUNTER — APPOINTMENT (OUTPATIENT)
Dept: LAB | Facility: HOSPITAL | Age: 25
End: 2025-03-20

## 2025-03-20 ENCOUNTER — TELEPHONE (OUTPATIENT)
Age: 25
End: 2025-03-20

## 2025-03-20 DIAGNOSIS — R30.0 DYSURIA: ICD-10-CM

## 2025-03-20 DIAGNOSIS — R30.0 DYSURIA: Primary | ICD-10-CM

## 2025-03-20 PROCEDURE — 87086 URINE CULTURE/COLONY COUNT: CPT

## 2025-03-20 NOTE — TELEPHONE ENCOUNTER
Patient returned call from office.  No notes in chart.  Confirmed appointment this afternoon.  Please contact if any issues.

## 2025-03-21 ENCOUNTER — RESULTS FOLLOW-UP (OUTPATIENT)
Dept: OTHER | Facility: HOSPITAL | Age: 25
End: 2025-03-21

## 2025-03-21 LAB — BACTERIA UR CULT: NORMAL

## 2025-03-21 NOTE — RESULT ENCOUNTER NOTE
Urine culture was negative. Please contact her to check if she is still symptomatic, if so please schedule an office appointment for next week so we can do a pelvic exam/ wet mount to identify cause of her discomfort.

## 2025-03-22 ENCOUNTER — HOSPITAL ENCOUNTER (OUTPATIENT)
Facility: HOSPITAL | Age: 25
Discharge: HOME | End: 2025-03-22
Attending: STUDENT IN AN ORGANIZED HEALTH CARE EDUCATION/TRAINING PROGRAM | Admitting: STUDENT IN AN ORGANIZED HEALTH CARE EDUCATION/TRAINING PROGRAM

## 2025-03-22 ENCOUNTER — HOSPITAL ENCOUNTER (EMERGENCY)
Facility: HOSPITAL | Age: 25
Discharge: STILL ADMITTED OR ADMINISTRATIVE | End: 2025-03-22
Attending: EMERGENCY MEDICINE | Admitting: EMERGENCY MEDICINE

## 2025-03-22 VITALS
TEMPERATURE: 98.1 F | RESPIRATION RATE: 18 BRPM | BODY MASS INDEX: 25.78 KG/M2 | DIASTOLIC BLOOD PRESSURE: 68 MMHG | HEART RATE: 81 BPM | WEIGHT: 151 LBS | SYSTOLIC BLOOD PRESSURE: 118 MMHG | HEIGHT: 64 IN | OXYGEN SATURATION: 100 %

## 2025-03-22 VITALS
RESPIRATION RATE: 18 BRPM | OXYGEN SATURATION: 100 % | DIASTOLIC BLOOD PRESSURE: 57 MMHG | SYSTOLIC BLOOD PRESSURE: 119 MMHG | TEMPERATURE: 97.9 F | HEART RATE: 90 BPM

## 2025-03-22 DIAGNOSIS — O26.892 ABDOMINAL PAIN DURING PREGNANCY IN SECOND TRIMESTER: Primary | ICD-10-CM

## 2025-03-22 DIAGNOSIS — R30.0 DYSURIA: ICD-10-CM

## 2025-03-22 DIAGNOSIS — R10.9 ABDOMINAL PAIN DURING PREGNANCY IN SECOND TRIMESTER: Primary | ICD-10-CM

## 2025-03-22 LAB
BILIRUB UR QL STRIP: NEGATIVE
CLARITY UR: CLEAR
COLOR UR: YELLOW
GLUCOSE UR STRIP-MCNC: NEGATIVE MG/DL
HGB UR QL STRIP.AUTO: NEGATIVE
KETONES UR STRIP-MCNC: NEGATIVE MG/DL
LEUKOCYTE ESTERASE UR QL STRIP: NEGATIVE
NITRITE UR QL STRIP: NEGATIVE
PH UR STRIP.AUTO: 6 [PH]
PROT UR STRIP-MCNC: NEGATIVE MG/DL
SP GR UR STRIP.AUTO: 1.02 (ref 1–1.03)
UROBILINOGEN UR QL STRIP.AUTO: 0.2 E.U./DL

## 2025-03-22 PROCEDURE — 76817 TRANSVAGINAL US OBSTETRIC: CPT | Performed by: STUDENT IN AN ORGANIZED HEALTH CARE EDUCATION/TRAINING PROGRAM

## 2025-03-22 PROCEDURE — 99285 EMERGENCY DEPT VISIT HI MDM: CPT | Performed by: PHYSICIAN ASSISTANT

## 2025-03-22 PROCEDURE — 99284 EMERGENCY DEPT VISIT MOD MDM: CPT

## 2025-03-22 PROCEDURE — 87491 CHLMYD TRACH DNA AMP PROBE: CPT | Performed by: PHYSICIAN ASSISTANT

## 2025-03-22 PROCEDURE — 81003 URINALYSIS AUTO W/O SCOPE: CPT | Performed by: PHYSICIAN ASSISTANT

## 2025-03-22 PROCEDURE — 99213 OFFICE O/P EST LOW 20 MIN: CPT

## 2025-03-22 PROCEDURE — 76817 TRANSVAGINAL US OBSTETRIC: CPT

## 2025-03-22 PROCEDURE — 87591 N.GONORRHOEAE DNA AMP PROB: CPT | Performed by: PHYSICIAN ASSISTANT

## 2025-03-22 PROCEDURE — NC001 PR NO CHARGE: Performed by: STUDENT IN AN ORGANIZED HEALTH CARE EDUCATION/TRAINING PROGRAM

## 2025-03-22 PROCEDURE — 87086 URINE CULTURE/COLONY COUNT: CPT | Performed by: PHYSICIAN ASSISTANT

## 2025-03-22 RX ORDER — ONDANSETRON 4 MG/1
4 TABLET, ORALLY DISINTEGRATING ORAL ONCE
Status: COMPLETED | OUTPATIENT
Start: 2025-03-22 | End: 2025-03-22

## 2025-03-22 RX ORDER — LIDOCAINE HYDROCHLORIDE 20 MG/ML
1 JELLY TOPICAL ONCE
Status: DISCONTINUED | OUTPATIENT
Start: 2025-03-22 | End: 2025-03-22 | Stop reason: HOSPADM

## 2025-03-22 RX ORDER — PHENAZOPYRIDINE HYDROCHLORIDE 100 MG/1
100 TABLET, FILM COATED ORAL
Status: DISCONTINUED | OUTPATIENT
Start: 2025-03-22 | End: 2025-03-22 | Stop reason: HOSPADM

## 2025-03-22 RX ORDER — ACETAMINOPHEN 325 MG/1
975 TABLET ORAL ONCE
Status: COMPLETED | OUTPATIENT
Start: 2025-03-22 | End: 2025-03-22

## 2025-03-22 RX ADMIN — PHENAZOPYRIDINE 100 MG: 100 TABLET ORAL at 14:01

## 2025-03-22 RX ADMIN — ACETAMINOPHEN 975 MG: 325 TABLET, FILM COATED ORAL at 13:19

## 2025-03-22 RX ADMIN — ONDANSETRON 4 MG: 4 TABLET, ORALLY DISINTEGRATING ORAL at 13:21

## 2025-03-22 NOTE — PROGRESS NOTES
Progress Note - OB/GYN   Name: Serg Balbuena 24 y.o. female I MRN: [id number]  Unit/Bed#:  TRIAGE 4 I Date of Admission: 3/22/2025   Date of Service: 3/22/2025 I Hospital Day: 0     Assessment & Plan  Abdominal pain  Cervix visually closed on speculum exam, no active bleeding or abnormal discharge  Microscopy negative  Cervical length 2.92cm  SVE c/t/h  FHR: 140 bpm via Doppler  Received Tylenol 975mg  Endorses relief of symptoms  Dysuria  Received pyridium  Declined Uro-Jet  Nausea/vomiting in pregnancy  Received Zofran 4mg  Endorses relief of symptoms    OB L&D Progress Note  Subjective   Serg Balbuena is a 23 yo  at 17w6d presenting with nausea, abdominal pain, and dysuria.  Patient previously called the [facility] office with concerns for dysuria.  A urine culture was sent which came back as negative.  She presented to the emergency room this morning with continued dysuria, abdominal pain, and nausea.  A UA was collected and completely normal.  GC/CT collected as well and pending.  Patient was then transferred to labor and delivery.  She reports she has had the dysuria for a couple days, though denies urgency and frequency.  The abdominal pain is new and reports it as sharp, left-sided, constant.  She has had nausea all her pregnancy and was prescribed Unisom and B6 but she has not been taking it.  She denies fevers, chills, vomiting, diarrhea, flank pain, sick contacts.    Objective :  Temp:  [97.9 °F (36.6 °C)-98.1 °F (36.7 °C)] 98.1 °F (36.7 °C)  HR:  [81-90] 81  BP: (118-119)/(57-68) 118/68  Resp:  [18] 18  SpO2:  [100 %] 100 %  O2 Device: None (Room air)    Physical Exam  Vitals and nursing note reviewed. Exam conducted with a chaperone present.   Constitutional:       General: She is not in acute distress.     Appearance: She is well-developed.   HENT:      Head: Normocephalic and atraumatic.   Eyes:      Conjunctiva/sclera: Conjunctivae normal.   Cardiovascular:      Rate and  Rhythm: Normal rate and regular rhythm.      Heart sounds: No murmur heard.  Pulmonary:      Effort: Pulmonary effort is normal. No respiratory distress.      Breath sounds: Normal breath sounds.   Abdominal:      General: There is no distension.      Palpations: Abdomen is soft.      Tenderness: There is abdominal tenderness. There is no guarding or rebound.   Genitourinary:     General: Normal vulva.      Vagina: Vaginal discharge (Physiologic) present. No erythema or bleeding.      Cervix: Normal. No discharge, friability or cervical bleeding.   Musculoskeletal:         General: No swelling.      Cervical back: Neck supple.   Skin:     General: Skin is warm and dry.      Capillary Refill: Capillary refill takes less than 2 seconds.   Neurological:      Mental Status: She is alert.   Psychiatric:         Mood and Affect: Mood normal.     Florencia Cerda  PGY-1 OB/GYN  03/22/25  5:26 PM

## 2025-03-22 NOTE — EMTALA/ACUTE CARE TRANSFER
Patient presents to [facility] with complaints of fatigue, chest tightness, and ear ache in L side     No  known Latex allergy or symptoms of Latex sensitivity.    Medications reviewed and smoking status updated.    Patient would like communication of results via:   [] Bo   [] Home Phone: [phone] (home)   [] Work Phone:  There is no work phone number on file.    [x] Cell Phone:    [phone] (mobile)        Emergency Contact: RANJIT BALLARD     [] Home:    [phone]     [] Work:         [] Mobile:   [phone]            [] Letter  Okay to leave message containing results? Yes       [facility] EMERGENCY DEPARTMENT  [address] [place] [id number]  Dept: [phone]      EMTALA TRANSFER CONSENT    NAME Serg MARCELINO 2000                              MRN [id number]    I have been informed of my rights regarding examination, treatment, and transfer   by Dr. Charity Diaz MD    Benefits: Specialized equipment and/or services available at the receiving facility (Include comment)________________________    Risks: Potential for delay in receiving treatment      Consent for Transfer:  I acknowledge that my medical condition has been evaluated and explained to me by the emergency department physician or other qualified medical person and/or my attending physician, who has recommended that I be transferred to the service of  Accepting Physician: Dr RAYA Kent at Accepting Facility Name, City & State : [facility]. The above potential benefits of such transfer, the potential risks associated with such transfer, and the probable risks of not being transferred have been explained to me, and I fully understand them.  The doctor has explained that, in my case, the benefits of transfer outweigh the risks.  I agree to be transferred.    I authorize the performance of emergency medical procedures and treatments upon me in both transit and upon arrival at the receiving facility.  Additionally, I authorize the release of any and all medical records to the receiving facility and request they be transported with me, if possible.  I understand that the safest mode of transportation during a medical emergency is an ambulance and that the Hospital advocates the use of this mode of transport. Risks of traveling to the receiving facility by car, including absence of medical control, life sustaining equipment, such as oxygen, and medical personnel has been explained to me and I fully understand them.    (JEFFY CORRECT BOX BELOW)  [  ]   I consent to the stated transfer and to be transported by ambulance/helicopter.  [  ]  I consent to the stated transfer, but refuse transportation by ambulance and accept full responsibility for my transportation by car.  I understand the risks of non-ambulance transfers and I exonerate the Hospital and its staff from any deterioration in my condition that results from this refusal.    X___________________________________________    DATE  25  TIME________  Signature of patient or legally responsible individual signing on patient behalf           RELATIONSHIP TO PATIENT_________________________          Provider Certification    NAME Serg Balbuena                                         2000                              MRN [id number]    A medical screening exam was performed on the above named patient.  Based on the examination:    Condition Necessitating Transfer The primary encounter diagnosis was Abdominal pain during pregnancy in second trimester. A diagnosis of Dysuria was also pertinent to this visit.    Patient Condition: The patient has been stabilized such that within reasonable medical probability, no material deterioration of the patient condition or the condition of the unborn child(arnold) is likely to result from the transfer    Reason for Transfer: Level of Care needed not available at this facility    Transfer Requirements: Facility [facility]   Space available and qualified personnel available for treatment as acknowledged by PAC  Agreed to accept transfer and to provide appropriate medical treatment as acknowledged by       Dr RAYA Kent  Appropriate medical records of the examination and treatment of the patient are provided at the time of transfer   STAFF INITIAL WHEN COMPLETED _______  Transfer will be performed by qualified personnel from Self driving  and appropriate transfer equipment as required, including the use of necessary and appropriate life support  measures.    Provider Certification: I have examined the patient and explained the following risks and benefits of being transferred/refusing transfer to the patient/family:  General risk, such as traffic hazards, adverse weather conditions, rough terrain or turbulence, possible failure of equipment (including vehicle or aircraft), or consequences of actions of persons outside the control of the transport personnel      Based on these reasonable risks and benefits to the patient and/or the unborn child(arnold), and based upon the information available at the time of the patient’s examination, I certify that the medical benefits reasonably to be expected from the provision of appropriate medical treatments at another medical facility outweigh the increasing risks, if any, to the individual’s medical condition, and in the case of labor to the unborn child, from effecting the transfer.    X____________________________________________ DATE 03/22/25        TIME_______      ORIGINAL - SEND TO MEDICAL RECORDS   COPY - SEND WITH PATIENT DURING TRANSFER

## 2025-03-22 NOTE — ED PROVIDER NOTES
"Time reflects when diagnosis was documented in both MDM as applicable and the Disposition within this note       Time User Action Codes Description Comment    3/22/2025 11:04 AM Roxann Gilliam Add [O26.892,  R10.9] Abdominal pain during pregnancy in second trimester     3/22/2025 11:04 AM Roxann Gilliam Add [R30.0] Dysuria           ED Disposition       ED Disposition   Transfer to Another Facility-In Network    Condition   --    Date/Time   Sat Mar 22, 2025 11:04 AM    Comment   Serg Balbuena should be transferred out to [facility].               Assessment & Plan       Medical Decision Making  Patient here with lower abdominal/vaginal pressure, dysuria and second trimester of pregnancy, good prenatal care  Exam is reassuring, vital signs stable, good fetal heart tones, cardiac activity and movement noted on bedside ultrasound urine shows no signs of infection urine culture from 2 days ago was negative will send off GC and chlamydia patient had 1 done 2 months ago which was negative but states she had recent sexual activity few weeks ago.  Patient stable for transfer to [facility] as she is almost 18 weeks pregnant with pregnancy related symptoms.     Amount and/or Complexity of Data Reviewed  External Data Reviewed: labs and notes.     Details: O + blood type  Urine culture done 2 days ago negative  1/22 neg GC/Chlamydia, pt states she's been sexually active since then, states a few weeks ago  Labs: ordered.  Discussion of management or test interpretation with external provider(s): PAC order placed, consulted with [facility], DR RAYA Kent at [facility] \"happy to accept, on another call feel comfortable with that plan without calling if you all are okay with it\", pt requesting to drive herself, has family with her, stable for DC to [facility] L&D        ED Course as of 03/22/25 1115   Sat Mar 22, 2025   1100 UA does not show any UTI     1100 PAC order placed to consult [facility] OB per protocol       Medications - No data to " display    ED Risk Strat Scores                            SBIRT 22yo+      Flowsheet Row Most Recent Value   Initial Alcohol Screen: US AUDIT-C     1. How often do you have a drink containing alcohol? 0 Filed at: 03/22/2025 1029   2. How many drinks containing alcohol do you have on a typical day you are drinking?  0 Filed at: 03/22/2025 1029   3b. FEMALE Any Age, or MALE 65+: How often do you have 4 or more drinks on one occassion? 0 Filed at: 03/22/2025 1029   Audit-C Score 0 Filed at: 03/22/2025 1029   JESSENIA: How many times in the past year have you...    Used an illegal drug or used a prescription medication for non-medical reasons? Never Filed at: 03/22/2025 1029                            History of Present Illness       Chief Complaint   Patient presents with    Pregnancy Problem     Pt states states that she is having extreme pressure vaginal pressure. States that she hasn't felt any fetal movement since yesterday but has been feeling movement since 13 weeks. Complaints of Dysuria, spotting yesterday, and discharge (Yellowish white and thick). Took iron pills and 81asa PTA 0830       Past Medical History:   Diagnosis Date    Anemia     Anxiety 02/13/2015    Asthma     Constipation     Depression 08/6/2013    GERD (gastroesophageal reflux disease)     Irritable bowel syndrome     Lower abdominal pain     Nausea     Weight loss       Past Surgical History:   Procedure Laterality Date    NO PAST SURGERIES        Family History   Problem Relation Age of Onset    No Known Problems Mother     Depression Father     Mental illness Father     Seizures Sister     No Known Problems Brother     Colon cancer Maternal Grandmother     Diabetes Maternal Grandmother     No Known Problems Paternal Grandmother     No Known Problems Paternal Grandfather     Breast cancer Neg Hx     Ovarian cancer Neg Hx       Social History     Tobacco Use    Smoking status: Former     Current packs/day: 0.00     Average packs/day: 0.3  "packs/day for 5.0 years (1.3 ttl pk-yrs)     Types: Cigars, Cigarettes     Start date:      Quit date: 2022     Years since quitting: 3.2    Smokeless tobacco: Never   Vaping Use    Vaping status: Former   Substance Use Topics    Alcohol use: Not Currently     Comment: socailly     Drug use: Not Currently     Types: Marijuana      E-Cigarette/Vaping    E-Cigarette Use Former User     Cartridges/Day 1       E-Cigarette/Vaping Substances    Nicotine No     THC No     CBD No     Flavoring No     Other No     Unknown No       I have reviewed and agree with the history as documented.     PMH: Iron deficiency anemia, IBS, Asthma, Tobacco use during pregnancy in first trimester, GERD, Anxiety/Depression  No PSH   today: 17 6/7 wks based on SIMONE 2025 by US done 2025.  LMP 11/15/2025  Pt presents to ED c/o lower abdominal/vaginal pressure, dysuria - midstream, and \"decreased fetal movement\", whitish vaginal discharge, no water leakage.   + constipation  Good prenatal care  Old records show: This is an unexpected but desired pregnancy.         Review of Systems   Constitutional:  Negative for fever.   Respiratory:  Negative for shortness of breath.    Cardiovascular:  Negative for chest pain.   Gastrointestinal:  Positive for abdominal pain and constipation.   Skin:  Negative for rash.   Neurological:  Negative for weakness.   All other systems reviewed and are negative.          Objective       ED Triage Vitals [25 1029]   Temperature Pulse Blood Pressure Respirations SpO2 Patient Position - Orthostatic VS   97.9 °F (36.6 °C) 90 119/57 18 100 % Sitting      Temp Source Heart Rate Source BP Location FiO2 (%) Pain Score    Oral Monitor Left arm -- --      Vitals      Date and Time Temp Pulse SpO2 Resp BP Pain Score FACES Pain Rating User   25 1029 97.9 °F (36.6 °C) 90 100 % 18 119/57 -- -- PE            Physical Exam  Vitals and nursing note reviewed.   Constitutional:       General: She is not " in acute distress.     Appearance: She is well-developed.   HENT:      Head: Normocephalic and atraumatic.      Nose: Nose normal.      Mouth/Throat:      Mouth: Mucous membranes are moist.      Pharynx: Oropharynx is clear.   Eyes:      Conjunctiva/sclera: Conjunctivae normal.   Cardiovascular:      Rate and Rhythm: Normal rate and regular rhythm.   Pulmonary:      Effort: Pulmonary effort is normal. No respiratory distress.      Breath sounds: Normal breath sounds.   Abdominal:      General: Bowel sounds are normal.      Palpations: Abdomen is soft.      Tenderness: There is no abdominal tenderness.      Comments: + FHT 's, fetal movement/cardiac activity seen with bedside US   Musculoskeletal:         General: Normal range of motion.      Cervical back: Normal range of motion.   Skin:     General: Skin is warm and dry.   Neurological:      Mental Status: She is alert.   Psychiatric:         Behavior: Behavior normal.         Results Reviewed       Procedure Component Value Units Date/Time    Chlamydia/GC amplified DNA by PCR [[id number]] Collected: 03/22/25 1050    Lab Status: In process Specimen: Urine, Other Updated: 03/22/25 1101    UA w Reflex to Microscopic w Reflex to Culture [[id number]] Collected: 03/22/25 1050    Lab Status: Final result Specimen: Urine, Clean Catch Updated: 03/22/25 1059     Color, UA Yellow     Clarity, UA Clear     Specific Gravity, UA 1.025     pH, UA 6.0     Leukocytes, UA Negative     Nitrite, UA Negative     Protein, UA Negative mg/dl      Glucose, UA Negative mg/dl      Ketones, UA Negative mg/dl      Urobilinogen, UA 0.2 E.U./dl      Bilirubin, UA Negative     Occult Blood, UA Negative     URINE COMMENT --    Urine culture [[id number]] Collected: 03/22/25 1050    Lab Status: In process Specimen: Urine, Clean Catch Updated: 03/22/25 1059            No orders to display       Procedures    ED Medication and Procedure Management   Prior to Admission Medications   Prescriptions  Last Dose Informant Patient Reported? Taking?   Prenatal Vit-Fe Fumarate-FA (PRENATAL PO) Unknown Self Yes No   Sig: Take by mouth   aspirin (ECOTRIN LOW STRENGTH) 81 mg EC tablet 3/22/2025 Morning Self No Yes   Sig: Take 2 tablets (162 mg total) by mouth daily Stop at 36 weeks.  Contact your OB or MFM with any side effects.  See [email or web address]://[email or web address]   doxylamine (UNISOM) 25 MG tablet  Self No No   Sig: Take 1 tablet (25 mg total) by mouth daily at bedtime as needed for nausea or sleep   Patient not taking: Reported on 2/5/2025   ferrous sulfate 324 (65 Fe) mg 3/22/2025 Morning  No Yes   Sig: Take 1 tablet (324 mg total) by mouth daily before breakfast   pyridoxine (VITAMIN B6) 50 mg tablet  Self No No   Sig: Take 1 tablet (50 mg total) by mouth 2 (two) times a day as needed (nausea in pregnancy)   Patient not taking: Reported on 2/5/2025      Facility-Administered Medications: None     Patient's Medications   Discharge Prescriptions    No medications on file     No discharge procedures on file.  ED SEPSIS DOCUMENTATION   Time reflects when diagnosis was documented in both MDM as applicable and the Disposition within this note       Time User Action Codes Description Comment    3/22/2025 11:04 AM Roxann Gilliam [O26.892,  R10.9] Abdominal pain during pregnancy in second trimester     3/22/2025 11:04 AM Roxann Gilliam [R30.0] Dysuria                  Roxann Gilliam PA-C  03/22/25 3434

## 2025-03-22 NOTE — NURSING NOTE
Arielacom  #[id number], Brittanie called around 1415 for triage admission. All questions answered. Pt verbalizes understanding.   EPIDERMAL INCLUSION CYST    Assessment and Plan:  Based on a thorough discussion of this condition and the management approach to it (including a comprehensive discussion of the known risks, side effects and potential benefits of treatment), the patient (family) agrees to implement the following specific plan:  Benign; reassured   Scheduled to have removed in [place] - if issues with scheduling can schedule with plastic surgery or general surgery     What are epidermal inclusion cysts?  Epidermal inclusion cysts are the most common, benign cutaneous cysts. There are many different names for epidermal inclusion cysts, including epidermoid cyst, epidermal cyst, infundibular cyst, inclusion cyst, and keratin cyst. These cysts can occur anywhere on the body and typically present as nodules directly underneath the skin. There is often a visible pore or opening in the center. The cysts are freely moveable and can range from a few millimeters to several centimeters in diameter. The center of epidermoid cysts almost always contains keratin, which has a cheesy appearance, and not sebum. They also do not originate from sebaceous glands. Therefore, epidermal inclusion cysts are not the same as sebaceous cysts.    Cysts may remain stable or progressively enlarge over time. There are no reliable predictive factors to tell if an epidermal inclusion cyst will enlarge, become inflamed, or remain quiescent. Infected cysts tend to become larger, turn red, and are more noticeable to the patient. There may be accompanying pain and discomfort.     What causes epidermal inclusion cysts?  Epidermal inclusion cysts often appear out of the blue and are not contagious. They are due to a proliferation of epidermal cells within the dermis and are more common in men than women. They occur more frequently in patients in their 20s to 40s. Epidermal inclusion cysts by themselves are usually not inherited, but they can be hereditary in rare  syndromes such as Brewer syndrome, nodular elastosis with cysts and comedones (Favre-Racouchot syndrome), and basal cell nevus syndrome (Gorlin syndrome). Elderly patients with chronic sun-damaged skin areas have a higher likelihood of developing epidermoid cysts. They often occur in areas where hair follicles have been inflamed or repeatedly irritated are more frequent in patients with acne vulgaris. In the  period, they are called milia.     Patients on BRAF inhibitors such as imiquimod and cyclosporine have a higher incidence of epidermoid cysts of the face.    How do we diagnose an epidermal inclusion cyst?  Epidermoid inclusion cysts are often diagnosed by history and physical exam. There is usually no need for biopsy prior to removal.  Radiographic and laboratory exams, such as ultrasound studies, are unnecessary and not typically ordered unless the practitioner suspects a genetic condition.    What is the treatment for an epidermal inclusion cyst?  Inflamed, uninfected epidermal inclusion cysts rarely resolve spontaneously without therapy or surgical intervention. Treatment is not emergent unless desired by the patient.     Definitive treatment is via surgical excision with walls intact. This method will prevent recurrence. This is best done when the cyst is not inflamed, to decrease the probability of rupture during surgery.   A local anesthetic will be injected around the cyst  A small incision is made in the skin overlying the cyst, and contents are expressed  The incision is repaired with sutures    Another option is to use a 4mm punch biopsy with cyst extraction through the defect.    Incision and drainage is often needed if the cyst is infected or inflamed. If there is surrounding cellulitis, oral antibiotic therapy may be necessary. The common agents used target methicillin sensitive Staphylococcal aureus and methicillin resistant S aureus in areas of high prevalence.      MELANOCYTIC NEVI  "(\"Moles\")    Assessment and Plan:  Based on a thorough discussion of this condition and the management approach to it (including a comprehensive discussion of the known risks, side effects and potential benefits of treatment), the patient (family) agrees to implement the following specific plan:  When outside we recommend using a wide brim hat, sunglasses, long sleeve and pants, sunscreen with SPF 30+ with reapplication every 2 hours, or SPF specific clothing   Benign, reassured  Annual skin check     Melanocytic Nevi  Melanocytic nevi (\"moles\") are tan or brown, raised or flat areas of the skin which have an increased number of melanocytes. Melanocytes are the cells in our body which make pigment and account for skin color.    Some moles are present at birth (I.e., \"congenital nevi\"), while others come up later in life (i.e., \"acquired nevi\").  The sun can stimulate the body to make more moles.  Sunburns are not the only thing that triggers more moles.  Chronic sun exposure can do it too.     Clinically distinguishing a healthy mole from melanoma may be difficult, even for experienced dermatologists. The \"ABCDE's\" of moles have been suggested as a means of helping to alert a person to a suspicious mole and the possible increased risk of melanoma.  The suggestions for raising alert are as follows:    Asymmetry: Healthy moles tend to be symmetric, while melanomas are often asymmetric.  Asymmetry means if you draw a line through the mole, the two halves do not match in color, size, shape, or surface texture. Asymmetry can be a result of rapid enlargement of a mole, the development of a raised area on a previously flat lesion, scaling, ulceration, bleeding or scabbing within the mole.  Any mole that starts to demonstrate \"asymmetry\" should be examined promptly by a board certified dermatologist.     Border: Healthy moles tend to have discrete, even borders.  The border of a melanoma often blends into the normal skin " "and does not sharply delineate the mole from normal skin.  Any mole that starts to demonstrate \"uneven borders\" should be examined promptly by a board certified dermatologist.     Color: Healthy moles tend to be one color throughout.  Melanomas tend to be made up of different colors ranging from dark black, blue, white, or red.  Any mole that demonstrates a color change should be examined promptly by a board certified dermatologist.     Diameter: Healthy moles tend to be smaller than 0.6 cm in size; an exception are \"congenital nevi\" that can be larger.  Melanomas tend to grow and can often be greater than 0.6 cm (1/4 of an inch, or the size of a pencil eraser). This is only a guideline, and many normal moles may be larger than 0.6 cm without being unhealthy.  Any mole that starts to change in size (small to bigger or bigger to smaller) should be examined promptly by a board certified dermatologist.     Evolving: Healthy moles tend to \"stay the same.\"  Melanomas may often show signs of change or evolution such as a change in size, shape, color, or elevation.  Any mole that starts to itch, bleed, crust, burn, hurt, or ulcerate or demonstrate a change or evolution should be examined promptly by a board certified dermatologist.        LENTIGO    Assessment and Plan:  Based on a thorough discussion of this condition and the management approach to it (including a comprehensive discussion of the known risks, side effects and potential benefits of treatment), the patient (family) agrees to implement the following specific plan:  When outside we recommend using a wide brim hat, sunglasses, long sleeve and pants, sunscreen with SPF 30+ with reapplication every 2 hours, or SPF specific clothing       What is a lentigo?  A lentigo is a pigmented flat or slightly raised lesion with a clearly defined edge. Unlike an ephelis (freckle), it does not fade in the winter months. There are several kinds of lentigo.  The name lentigo " originally referred to its appearance resembling a small lentil. The plural of lentigo is lentigines, although “lentigos” is also in common use.    Who gets lentigines?  Lentigines can affect males and females of all ages and races. Solar lentigines are especially prevalent in fair skinned adults. Lentigines associated with syndromes are present at birth or arise during childhood.    What causes lentigines?  Common forms of lentigo are due to exposure to ultraviolet radiation:  Sun damage including sunburn   Indoor tanning   Phototherapy, especially photochemotherapy (PUVA)    Ionizing radiation, eg radiation therapy, can also cause lentigines.  Several familial syndromes associated with widespread lentigines originate from mutations in Jose-MAP kinase, mTOR signaling and PTEN pathways.    What is the treatment for lentigines?  Most lentigines are left alone. Attempts to lighten them may not be successful. The following approaches are used:  SPF 50+ broad-spectrum sunscreen   Hydroquinone bleaching cream   Alpha hydroxy acids   Vitamin C   Retinoids   Azelaic acid   Chemical peels  Individual lesions can be permanently removed using:  Cryotherapy   Intense pulsed light   Pigment lasers    How can lentigines be prevented?  Lentigines associated with exposure ultraviolet radiation can be prevented by very careful sun protection. Clothing is more successful at preventing new lentigines than are sunscreens.    What is the outlook for lentigines?  Lentigines usually persist. They may increase in number with age and sun exposure. Some in sun-protected sites may fade and disappear.    VIDES ANGIOMAS    Assessment and Plan:  Based on a thorough discussion of this condition and the management approach to it (including a comprehensive discussion of the known risks, side effects and potential benefits of treatment), the patient (family) agrees to implement the following specific plan:  Monitor for changes  Benign,  "reassured      Assessment and Plan:    Cherry angioma, also known as English de Balaji spots, are benign vascular skin lesions. A \"cherry angioma\" is a firm red, blue or purple papule, 0.1-1 cm in diameter. When thrombosed, they can appear black in colour until evaluated with a dermatoscope when the red or purple colour is more easily seen. Cherry angioma may develop on any part of the body but most often appear on the scalp, face, lips and trunk.  An angioma is due to proliferating endothelial cells; these are the cells that line the inside of a blood vessel.    Angiomas can arise in early life or later in life; the most common type of angioma is a cherry angioma.  Cherry angiomas are very common in males and females of any age or race. They are more noticeable in white skin than in skin of colour. They markedly increase in number from about the age of 40. There may be a family history of similar lesions. Eruptive cherry angiomas have been rarely reported to be associated with internal malignancy. The cause of angiomas is unknown. Genetic analysis of cherry angiomas has shown that they frequently carry specific somatic missense mutations in the GNAQ and GNA11 (Q209H) genes, which are involved in other vascular and melanocytic proliferations.      SEBORRHEIC KERATOSIS; NON-INFLAMED    Assessment and Plan:  Based on a thorough discussion of this condition and the management approach to it (including a comprehensive discussion of the known risks, side effects and potential benefits of treatment), the patient (family) agrees to implement the following specific plan:  Monitor for changes  Benign, reassured      Seborrheic Keratosis  A seborrheic keratosis is a harmless warty spot that appears during adult life as a common sign of skin aging.  Seborrheic keratoses can arise on any area of skin, covered or uncovered, with the usual exception of the palms and soles. They do not arise from mucous membranes. Seborrheic " "keratoses can have highly variable appearance.      Seborrheic keratoses are extremely common. It has been estimated that over 90% of adults over the age of 60 years have one or more of them. They occur in males and females of all races, typically beginning to erupt in the 30s or 40s. They are uncommon under the age of 20 years.  The precise cause of seborrhoeic keratoses is not known.  Seborrhoeic keratoses are considered degenerative in nature. As time goes by, seborrheic keratoses tend to become more numerous. Some people inherit a tendency to develop a very large number of them; some people may have hundreds of them.      There is no easy way to remove multiple lesions on a single occasion.  Unless a specific lesion is \"inflamed\" and is causing pain or stinging/burning or is bleeding, most insurance companies do not authorize treatment.    XEROSIS (\"DRY SKIN\")    Assessment and Plan:  Based on a thorough discussion of this condition and the management approach to it (including a comprehensive discussion of the known risks, side effects and potential benefits of treatment), the patient (family) agrees to implement the following specific plan:  Use moisturizer like Eucerin,Cerave or Aveeno Cream 3 times a day for the dry skin            Dry skin refers to skin that feels dry to touch. Dry skin has a dull surface with a rough, scaly quality. The skin is less pliable and cracked. When dryness is severe, the skin may become inflamed and fissured.  Although any body site can be dry, dry skin tends to affect the shins more than any other site.    Dry skin is lacking moisture in the outer horny cell layer (stratum corneum) and this results in cracks in the skin surface.  Dry skin is also called xerosis, xeroderma or asteatosis (lack of fat).  It can affect males and females of all ages. There is some racial variability in water and lipid content of the skin.  Dry skin that starts in early childhood may be one of about " 20 types of ichthyosis (fish-scale skin). There is often a family history of dry skin.   Dry skin is commonly seen in people with atopic dermatitis.  Nearly everyone > 60 years has dry skin.    Dry skin that begins later may be seen in people with certain diseases and conditions.  Postmenopausal women  Hypothyroidism  Chronic renal disease   Malnutrition and weight loss   Subclinical dermatitis   Treatment with certain drugs such as oral retinoids, diuretics and epidermal growth factor receptor inhibitors      What is the treatment for dry skin?  The mainstay of treatment of dry skin and ichthyosis is moisturisers/emollients. They should be applied liberally and often enough to:  Reduce itch   Improve the barrier function   Prevent entry of irritants, bacteria   Reduce transepidermal water loss.      How can dry skin be prevented?  Eliminate aggravating factors:  Reduce the frequency of bathing.   A humidifier in winter and air conditioner in summer   Compare having a short shower with a prolonged soak in a bath.   Use lukewarm, not hot, water.   Replace standard soap with a substitute such as a synthetic detergent cleanser, water-miscible emollient, bath oil, anti-pruritic tar oil, colloidal oatmeal etc.   Apply an emollient liberally and often, particularly shortly after bathing, and when itchy. The drier the skin, the thicker this should be, especially on the hands.    What is the outlook for dry skin?  A tendency to dry skin may persist life-long, or it may improve once contributing factors are controlled.

## 2025-03-22 NOTE — PROCEDURES
Serg Balbuena, a  at 17w6d with an SIMONE of 2025, Date entered prior to episode creation, was seen at [facility] for the following procedure(s): $Procedure Type: US - Transvaginal]    Ultrasound Other  Cervical Length: 2.9        Ultrasound Probe Disinfection    A transvaginal ultrasound was performed.   Prior to use, disinfection was performed with High Level Disinfection Process (Trophon)  Probe serial number [id number]: [id number] was used    Florencia Cerda MD  25  6:10 PM

## 2025-03-22 NOTE — DISCHARGE INSTRUCTIONS
GO to [facility] to L&D triage.   You can go in through the ED, and they can bring you there; DO NOT register as a new patient in the ED, you are a transfer to L&D

## 2025-03-22 NOTE — ASSESSMENT & PLAN NOTE
Cervix visually closed on speculum exam, no active bleeding or abnormal discharge  Microscopy negative  Cervical length 2.92cm  SVE c/t/h  FHR: 140 bpm via Doppler  Received Tylenol 975mg  Endorses relief of symptoms   Patient presents today for a follow up for chronic pain, HTN, hypercholesteremia, hypothyroid, and DM.

## 2025-03-23 LAB
C TRACH DNA SPEC QL NAA+PROBE: NEGATIVE
N GONORRHOEA DNA SPEC QL NAA+PROBE: NEGATIVE

## 2025-03-24 LAB — BACTERIA UR CULT: ABNORMAL

## 2025-03-26 NOTE — PROGRESS NOTES
[facility]  OB/GYN prenatal visit    S: 24 y.o.  18w3d here for a problem PN visit. She has no obstetric complaints, including pelvic pain, contractions, vaginal bleeding, loss of fluid,  She was seen in triage 3/22/2025 for abdominal pain and she had a urine culture that was negative on 3/20/2025 and urine culture done on 3/22/2025 showed 50-59,000 lactobacillus- reviewed from vaginal contamination. She reports a yeast type smell in her vaginal, reports a thicker dsch, some burning with urination. She reports her abdominal pain is less, mostly feels with activity, was on LLQ. She reports IBS constipation dominant and has a BM every 3 days. Uses Miralax as needed. Reviewed to try a daily stool softener. Drinks adequate water, reviewed fiber in diet.   Negative for chlamydia and gonorrhea  O:  Vitals:    25 0838   BP: 100/62   Pulse: 76       Gen: no acute distress, nonlabored breathing  Fundal Height (cm): 19 cm  Fetal Heart Rate: 152  Abdomen soft NT  Wet mt/KOH positive for yeast  A/P:      IUP at 18w3d  Monistat vaginal cream nightly x 7 days for yeast  Colace ordered BID as needed for constipation  Has level 2 US scheduled for 25  Discussed  labor precautions and fetal kick counts    Return to clinic in 2 weeks at next scheduled appt.         DELMER Coburn  3/27/2025  9:20 AM

## 2025-03-26 NOTE — TELEPHONE ENCOUNTER
----- Message from Radha Nguyen MD sent at 3/21/2025  6:04 PM EDT -----  Urine culture was negative. Please contact her to check if she is still symptomatic, if so please schedule an office appointment for next week so we can do a pelvic exam/ wet mount to identify cause of her discomfort.

## 2025-03-27 ENCOUNTER — ROUTINE PRENATAL (OUTPATIENT)
Dept: OBGYN CLINIC | Facility: CLINIC | Age: 25
End: 2025-03-27

## 2025-03-27 VITALS
WEIGHT: 152.4 LBS | SYSTOLIC BLOOD PRESSURE: 100 MMHG | HEART RATE: 76 BPM | DIASTOLIC BLOOD PRESSURE: 62 MMHG | BODY MASS INDEX: 26.16 KG/M2

## 2025-03-27 DIAGNOSIS — O99.612 CONSTIPATION DURING PREGNANCY IN SECOND TRIMESTER: Primary | ICD-10-CM

## 2025-03-27 DIAGNOSIS — Z3A.18 18 WEEKS GESTATION OF PREGNANCY: ICD-10-CM

## 2025-03-27 DIAGNOSIS — K59.00 CONSTIPATION DURING PREGNANCY IN SECOND TRIMESTER: Primary | ICD-10-CM

## 2025-03-27 DIAGNOSIS — B37.31 VAGINAL YEAST INFECTION: ICD-10-CM

## 2025-03-27 PROBLEM — N92.0 MENORRHAGIA WITH REGULAR CYCLE: Status: RESOLVED | Noted: 2022-03-01 | Resolved: 2025-03-27

## 2025-03-27 LAB
BV WHIFF TEST VAG QL: ABNORMAL
CLUE CELLS SPEC QL WET PREP: ABNORMAL
PH SMN: 4 [PH]
SL AMB POCT WET MOUNT: ABNORMAL
T VAGINALIS VAG QL WET PREP: ABNORMAL
YEAST VAG QL WET PREP: ABNORMAL

## 2025-03-27 PROCEDURE — 99213 OFFICE O/P EST LOW 20 MIN: CPT | Performed by: NURSE PRACTITIONER

## 2025-03-27 PROCEDURE — 87210 SMEAR WET MOUNT SALINE/INK: CPT | Performed by: NURSE PRACTITIONER

## 2025-03-27 RX ORDER — DOCUSATE SODIUM 100 MG/1
100 CAPSULE, LIQUID FILLED ORAL 2 TIMES DAILY PRN
Qty: 60 CAPSULE | Refills: 2 | Status: SHIPPED | OUTPATIENT
Start: 2025-03-27

## 2025-03-27 RX ORDER — MICONAZOLE NITRATE 2 %
1 CREAM WITH APPLICATOR VAGINAL
Qty: 45 G | Refills: 0 | Status: SHIPPED | OUTPATIENT
Start: 2025-03-27 | End: 2025-04-03

## 2025-04-08 ENCOUNTER — ROUTINE PRENATAL (OUTPATIENT)
Dept: OBGYN CLINIC | Facility: CLINIC | Age: 25
End: 2025-04-08

## 2025-04-08 VITALS
WEIGHT: 156 LBS | HEART RATE: 86 BPM | SYSTOLIC BLOOD PRESSURE: 109 MMHG | HEIGHT: 64 IN | BODY MASS INDEX: 26.63 KG/M2 | DIASTOLIC BLOOD PRESSURE: 72 MMHG

## 2025-04-08 DIAGNOSIS — Z3A.20 20 WEEKS GESTATION OF PREGNANCY: ICD-10-CM

## 2025-04-08 DIAGNOSIS — O99.612 CONSTIPATION DURING PREGNANCY IN SECOND TRIMESTER: Primary | ICD-10-CM

## 2025-04-08 DIAGNOSIS — K59.00 CONSTIPATION DURING PREGNANCY IN SECOND TRIMESTER: Primary | ICD-10-CM

## 2025-04-08 PROCEDURE — 99213 OFFICE O/P EST LOW 20 MIN: CPT | Performed by: NURSE PRACTITIONER

## 2025-04-08 NOTE — PROGRESS NOTES
[facility]  OB/GYN prenatal visit    S: 24 y.o.  20w2d here for PN visit. She has no obstetric complaints, including pelvic pain, contractions, vaginal bleeding, loss of fluid, or decreased fetal movement.   Was prescribed stool softener at her last appt for constipation, has helped.  She was also treated for yeast infection at that appt, no further symptoms  O:  Vitals:    25 1659   BP: 109/72   Pulse: 86       Gen: no acute distress, nonlabored breathing  Fundal Height (cm): 20 cm  Fetal Heart Rate: 157  Abdomen soft, NT  A/P:      IUP at 20w2d  No obstetric complaints today  To complete hemoglobin electrophoresis  Has level 2 scheduled 25  LDASA 162 mgs daily, taking  Discussed  labor precautions and fetal kick counts    Return to clinic in 4 weeks    SIMONE - 2025 Problems (from 25 to present)       Problem Noted Diagnosed Resolved    20 weeks gestation of pregnancy 2025 by Mary Johns MD  No    Overview Addendum 3/16/2025 12:03 PM by Padmini Pugh MD   Overview:  Labs  Pap smear NILM; GC/CT neg  Prenatal panel wnl other than ADAN  28w labs pending [ ]  MSAFP wnl  Medication  PNV  B6 & doxylamine BID PRN for nausea/vomiting   Vaccines:  Flu vaccine: declines  Covid vaccine: did not receive before, does not want to  Tdap vaccine: will offer at 28 weeks  RSV vaccine: will offer 32-36 weeks if during season  Genetic screening: [facility] appt for NT on 25  Contraception: POPs  Feeding plan: breastfeeding  Birth plan:  with epidural  Delivery consent: to be signed at 28w []  Ultrasounds: 25 wnl, level 2 scheduled 25                    DELMER Coburn  2025  5:26 PM

## 2025-04-16 ENCOUNTER — ROUTINE PRENATAL (OUTPATIENT)
Facility: HOSPITAL | Age: 25
End: 2025-04-16

## 2025-04-16 VITALS
HEART RATE: 115 BPM | HEIGHT: 64 IN | BODY MASS INDEX: 26.73 KG/M2 | WEIGHT: 156.6 LBS | SYSTOLIC BLOOD PRESSURE: 98 MMHG | DIASTOLIC BLOOD PRESSURE: 56 MMHG

## 2025-04-16 DIAGNOSIS — Z36.3 ENCOUNTER FOR ANTENATAL SCREENING FOR MALFORMATION: ICD-10-CM

## 2025-04-16 DIAGNOSIS — Z36.86 ENCOUNTER FOR ANTENATAL SCREENING FOR CERVICAL LENGTH: ICD-10-CM

## 2025-04-16 DIAGNOSIS — Z3A.21 21 WEEKS GESTATION OF PREGNANCY: Primary | ICD-10-CM

## 2025-04-16 PROBLEM — O99.331: Status: RESOLVED | Noted: 2025-02-17 | Resolved: 2025-04-16

## 2025-04-16 PROCEDURE — 76805 OB US >/= 14 WKS SNGL FETUS: CPT | Performed by: OBSTETRICS & GYNECOLOGY

## 2025-04-16 PROCEDURE — 76817 TRANSVAGINAL US OBSTETRIC: CPT | Performed by: OBSTETRICS & GYNECOLOGY

## 2025-04-16 PROCEDURE — 99213 OFFICE O/P EST LOW 20 MIN: CPT | Performed by: OBSTETRICS & GYNECOLOGY

## 2025-04-16 NOTE — PROGRESS NOTES
Ultrasound Probe Disinfection    A transvaginal ultrasound was performed.   Prior to use, disinfection was performed with High Level Disinfection Process (OnePIN).  Probe serial number [id number] [facility] [id number] was used.    Beatriz Humphreys  04/16/25  2:35 PM

## 2025-04-16 NOTE — PROGRESS NOTES
"[facility]: Serg Balbuena was seen today for anatomic survey and cervical length screening ultrasound.  See ultrasound report under \"OB Procedures\" tab.   Please don't hesitate to contact our office with any concerns or questions.  -Salome Fletcher MD      "

## 2025-04-29 ENCOUNTER — HOSPITAL ENCOUNTER (EMERGENCY)
Facility: HOSPITAL | Age: 25
Discharge: HOME | End: 2025-04-29
Attending: EMERGENCY MEDICINE

## 2025-04-29 ENCOUNTER — TELEPHONE (OUTPATIENT)
Dept: OBGYN CLINIC | Facility: CLINIC | Age: 25
End: 2025-04-29

## 2025-04-29 VITALS
TEMPERATURE: 99.4 F | SYSTOLIC BLOOD PRESSURE: 124 MMHG | DIASTOLIC BLOOD PRESSURE: 67 MMHG | OXYGEN SATURATION: 99 % | RESPIRATION RATE: 16 BRPM | HEART RATE: 84 BPM

## 2025-04-29 DIAGNOSIS — J02.9 SORE THROAT: Primary | ICD-10-CM

## 2025-04-29 LAB
FLUAV AG UPPER RESP QL IA.RAPID: NEGATIVE
FLUBV AG UPPER RESP QL IA.RAPID: NEGATIVE
S PYO DNA THROAT QL NAA+PROBE: NOT DETECTED
SARS-COV+SARS-COV-2 AG RESP QL IA.RAPID: NEGATIVE

## 2025-04-29 PROCEDURE — 87591 N.GONORRHOEAE DNA AMP PROB: CPT | Performed by: EMERGENCY MEDICINE

## 2025-04-29 PROCEDURE — 36415 COLL VENOUS BLD VENIPUNCTURE: CPT | Performed by: EMERGENCY MEDICINE

## 2025-04-29 PROCEDURE — 87651 STREP A DNA AMP PROBE: CPT | Performed by: EMERGENCY MEDICINE

## 2025-04-29 PROCEDURE — 87811 SARS-COV-2 COVID19 W/OPTIC: CPT | Performed by: EMERGENCY MEDICINE

## 2025-04-29 PROCEDURE — 87491 CHLMYD TRACH DNA AMP PROBE: CPT

## 2025-04-29 PROCEDURE — 99283 EMERGENCY DEPT VISIT LOW MDM: CPT

## 2025-04-29 PROCEDURE — 87804 INFLUENZA ASSAY W/OPTIC: CPT | Performed by: EMERGENCY MEDICINE

## 2025-04-29 PROCEDURE — 99284 EMERGENCY DEPT VISIT MOD MDM: CPT | Performed by: EMERGENCY MEDICINE

## 2025-04-29 NOTE — TELEPHONE ENCOUNTER
Pt calls w/ concerns of sore throat. States 2 weeks ago had sexual encounter and ever since then has had a sore, irritated throat. Denies any sores or lesions in the mouth. Advised pt be evaluated by PCP first then if needs GYN we can evaluate. Pt states does not have insurance or pcp. Provided [facility] phone #.

## 2025-04-29 NOTE — ED PROVIDER NOTES
Time reflects when diagnosis was documented in both MDM as applicable and the Disposition within this note       Time User Action Codes Description Comment    4/29/2025  6:00 PM Nito Palafox Add [J02.9] Sore throat           ED Disposition       ED Disposition   Discharge    Condition   Stable    Date/Time   Tue Apr 29, 2025  6:00 PM    Comment   Serg Balbuena discharge to home/self care.                   Assessment & Plan       Medical Decision Making  Patient presenting for sore throat for the last 2 weeks as well as headache for the last day or 2.    On exam no trismus, hot potato voice.  She has mild laryngitis.    On exam has mild erythema of the tonsils as well as the uvula.  Midline uvula.  No signs of abscess.    Differential including but not limited to URI, sore throat, strep unlikely but possible, viral illness, COVID, flu, STI.    Will send gonorrhea culture.  Will send gonorrhea and chlamydia PCR.    Will check for COVID, flu, strep.    Patient has to be discharged prior to any results coming back.    Problems Addressed:  Sore throat: acute illness or injury    Amount and/or Complexity of Data Reviewed  Labs: ordered.             Medications - No data to display    ED Risk Strat Scores                    No data recorded                            History of Present Illness       Chief Complaint   Patient presents with    Sore Throat     Pt reports sore throat that started 2 weeks ago after giving oral sex to their partner. No meds pta. Pt currently 23 weeks pregnant       Past Medical History:   Diagnosis Date    Anemia     Anxiety 02/13/2015    Asthma     Constipation     Depression 08/6/2013    GERD (gastroesophageal reflux disease)     Irritable bowel syndrome     Lower abdominal pain     Nausea     Weight loss       Past Surgical History:   Procedure Laterality Date    NO PAST SURGERIES        Family History   Problem Relation Age of Onset    No Known Problems Mother     Depression Father      Mental illness Father     Seizures Sister     No Known Problems Brother     Colon cancer Maternal Grandmother     Diabetes Maternal Grandmother     No Known Problems Paternal Grandmother     No Known Problems Paternal Grandfather     Breast cancer Neg Hx     Ovarian cancer Neg Hx       Social History     Tobacco Use    Smoking status: Former     Current packs/day: 0.00     Average packs/day: 0.3 packs/day for 5.0 years (1.3 ttl pk-yrs)     Types: Cigars, Cigarettes     Start date: 2018     Quit date: 1/1/2022     Years since quitting: 3.3    Smokeless tobacco: Never   Vaping Use    Vaping status: Former   Substance Use Topics    Alcohol use: Not Currently     Comment: socailly     Drug use: Not Currently     Types: Marijuana      E-Cigarette/Vaping    E-Cigarette Use Former User     Cartridges/Day 1       E-Cigarette/Vaping Substances    Nicotine No     THC No     CBD No     Flavoring No     Other No     Unknown No       I have reviewed and agree with the history as documented.     24-year-old female G1, P0 at 23 weeks of pregnancy presenting to the emergency department with sore throat for the last 2 weeks.  She reports having receptive unprotected oral intercourse 2 weeks ago.  Since then has had a mild sore throat.  She reports over the last day she has had a mild headache and is starting to have laryngitis. This is a new sexual partner.    No alleviating or exacerbating factors.  Has tried nothing for the symptoms.    She has not had vaginal intercourse with this new partner.          Sore Throat  Location:  Generalized  Associated symptoms: rhinorrhea        Review of Systems   HENT:  Positive for rhinorrhea and sore throat.    All other systems reviewed and are negative.          Objective       ED Triage Vitals [04/29/25 1701]   Temperature Pulse Blood Pressure Respirations SpO2 Patient Position - Orthostatic VS   99.4 °F (37.4 °C) 84 124/67 16 99 % Sitting      Temp Source Heart Rate Source BP Location FiO2  (%) Pain Score    Oral Monitor Left arm -- --      Vitals      Date and Time Temp Pulse SpO2 Resp BP Pain Score FACES Pain Rating User   04/29/25 1701 99.4 °F (37.4 °C) 84 99 % 16 124/67 -- -- RN            Physical Exam  Vitals and nursing note reviewed.   Constitutional:       General: She is not in acute distress.     Appearance: Normal appearance. She is not ill-appearing.   HENT:      Head: Normocephalic and atraumatic.      Right Ear: External ear normal.      Left Ear: External ear normal.      Nose: Nose normal.      Mouth/Throat:      Mouth: Mucous membranes are moist.      Comments: Mild erythema on the uvula and tonsils.  No trismus.  No hot potato voice.  Midline uvula.  No exudates.  Eyes:      General:         Right eye: No discharge.         Left eye: No discharge.      Conjunctiva/sclera: Conjunctivae normal.   Cardiovascular:      Rate and Rhythm: Normal rate and regular rhythm.      Pulses: Normal pulses.      Heart sounds: No murmur heard.  Pulmonary:      Effort: Pulmonary effort is normal.      Breath sounds: Normal breath sounds.   Abdominal:      General: Abdomen is flat. There is no distension.      Tenderness: There is no abdominal tenderness.   Musculoskeletal:         General: Normal range of motion.      Cervical back: Normal range of motion.   Skin:     General: Skin is warm.      Capillary Refill: Capillary refill takes less than 2 seconds.      Findings: No rash.   Neurological:      General: No focal deficit present.      Mental Status: She is alert. Mental status is at baseline.   Psychiatric:         Mood and Affect: Mood normal.         Behavior: Behavior normal.         Results Reviewed       Procedure Component Value Units Date/Time    Strep A PCR [[id number]]  (Normal) Collected: 04/29/25 1758    Lab Status: Final result Specimen: Throat Updated: 04/29/25 1833     STREP A PCR Not Detected    FLU/COVID Rapid Antigen (30 min. TAT) - Preferred screening test in ED [[id number]]   (Normal) Collected: 04/29/25 1758    Lab Status: Final result Specimen: Nares from Nose Updated: 04/29/25 1824     SARS COV Rapid Antigen Negative     Influenza A Rapid Antigen Negative     Influenza B Rapid Antigen Negative    Narrative:      This test has been performed using the Quidel Neda 2 FLU+SARS Antigen test under the Emergency Use Authorization (EUA). This test has been validated by the  and verified by the performing laboratory. The Neda uses lateral flow immunofluorescent sandwich assay to detect SARS-COV, Influenza A and Influenza B Antigen.     The Quidel Neda 2 SARS Antigen test does not differentiate between SARS-CoV and SARS-CoV-2.     Negative results are presumptive and may be confirmed with a molecular assay, if necessary, for patient management. Negative results do not rule out SARS-CoV-2 or influenza infection and should not be used as the sole basis for treatment or patient management decisions. A negative test result may occur if the level of antigen in a sample is below the limit of detection of this test.     Positive results are indicative of the presence of viral antigens, but do not rule out bacterial infection or co-infection with other viruses.     All test results should be used as an adjunct to clinical observations and other information available to the provider.    FOR PEDIATRIC PATIENTS - copy/paste COVID Guidelines URL to browser: [email or web address]    [email or web address]; No; [id number]; [facility] - Miscellaneous Test [[id number]] Collected: 04/29/25 1758    Lab Status: In process Specimen: Blood from Other Updated: 04/29/25 1802            No orders to display       Procedures    ED Medication and Procedure Management   Prior to Admission Medications   Prescriptions Last Dose Informant Patient Reported? Taking?   Prenatal Vit-Fe Fumarate-FA (PRENATAL PO)  Self Yes No   Sig: Take by mouth   aspirin (ECOTRIN LOW STRENGTH) 81  mg EC tablet  Self No No   Sig: Take 2 tablets (162 mg total) by mouth daily Stop at 36 weeks.  Contact your OB or MFM with any side effects.  See [email or web address]   docusate sodium (COLACE) 100 mg capsule   No No   Sig: Take 1 capsule (100 mg total) by mouth 2 (two) times a day as needed for constipation   doxylamine (UNISOM) 25 MG tablet  Self No No   Sig: Take 1 tablet (25 mg total) by mouth daily at bedtime as needed for nausea or sleep   ferrous sulfate 324 (65 Fe) mg   No No   Sig: Take 1 tablet (324 mg total) by mouth daily before breakfast   pyridoxine (VITAMIN B6) 50 mg tablet  Self No No   Sig: Take 1 tablet (50 mg total) by mouth 2 (two) times a day as needed (nausea in pregnancy)      Facility-Administered Medications: None     Discharge Medication List as of 4/29/2025  6:00 PM        CONTINUE these medications which have NOT CHANGED    Details   aspirin (ECOTRIN LOW STRENGTH) 81 mg EC tablet Take 2 tablets (162 mg total) by mouth daily Stop at 36 weeks.  Contact your OB or MFM with any side effects.  See [email or web address], Starting Thu 2/20/2025, Normal      docusate sodium (COLACE) 100 mg capsule Take 1 capsule (100 mg total) by mouth 2 (two) times a day as needed for constipation, Starting Thu 3/27/2025, Normal      doxylamine (UNISOM) 25 MG tablet Take 1 tablet (25 mg total) by mouth daily at bedtime as needed for nausea or sleep, Starting Wed 1/22/2025, Normal      ferrous sulfate 324 (65 Fe) mg Take 1 tablet (324 mg total) by mouth daily before breakfast, Starting Tue 3/11/2025, Normal      Prenatal Vit-Fe Fumarate-FA (PRENATAL PO) Take by mouth, Historical Med      pyridoxine (VITAMIN B6) 50 mg tablet Take 1 tablet (50 mg total) by mouth 2 (two) times a day as needed (nausea in pregnancy), Starting Wed 1/22/2025, Until Wed 4/16/2025 at 2359, Normal           No discharge procedures on file.  ED SEPSIS DOCUMENTATION   Time reflects when diagnosis was  documented in both MDM as applicable and the Disposition within this note       Time User Action Codes Description Comment    4/29/2025  6:00 PM Nito Palafox Add [J02.9] Sore throat                  Nito Palafox,   04/29/25 210

## 2025-04-29 NOTE — DISCHARGE INSTRUCTIONS
Take Tylenol, use hot tea or cold drinks as needed for the pain.    Come back for new or worsening symptoms.  You will be called if your testing is positive.

## 2025-05-06 LAB — MISCELLANEOUS LAB TEST RESULT: NORMAL

## 2025-05-07 PROBLEM — Z3A.24 24 WEEKS GESTATION OF PREGNANCY: Status: ACTIVE | Noted: 2025-01-22

## 2025-05-07 NOTE — PROGRESS NOTES
[facility]  OB/GYN prenatal visit    S: 24 y.o.  24w3d here for PN visit. She has no obstetric complaints, including pelvic pain, contractions, vaginal bleeding, loss of fluid, or decreased fetal movement.     O:  Vitals:    25 1121   BP: 95/65   Pulse: (!) 109   Resp: 18       Gen: no acute distress, nonlabored breathing  Fundal Height (cm): 24 cm  Fetal Heart Rate: 137  Abdomen soft, NT  A/P:      IUP at 24w3d  No obstetric complaints today  She is moving to [place]  in 4 days  SW  gave her resources to obtain OB/GYN in [place] [facility] 25, breech, anterior placenta  28 wk labs ordered, plans to get done tomorrow  Discussed  labor precautions and fetal kick counts    Return to clinic in 4 weeks    SIMONE - 2025 Problems (from 25 to present)       Problem Noted Diagnosed Resolved    24 weeks gestation of pregnancy 2025 by Mary Johns MD  No    Overview Addendum 2025 11:45 AM by DELMER Del Rosario   Overview:  Labs  Pap smear NILM; GC/CT neg  Prenatal panel wnl other than ADAN  28w labs pending [ ]  MSAFP wnl  Medication  PNV  B6 & doxylamine BID PRN for nausea/vomiting   Vaccines:  Flu vaccine: declines  Covid vaccine: did not receive before, does not want to  Tdap vaccine: will offer at 28 weeks  RSV vaccine: will offer 32-36 weeks if during season  Genetic screening: M appt for NT on 25  Contraception: POPs  Feeding plan: breastfeeding  Birth plan:  with epidural  Delivery consent: to be signed at 28w []  Ultrasounds: 25 wnl, level 2 scheduled 25-breech, anterior placenta, no further ultrasounds scheduled                    DELMER Del Rosario  2025  11:43 AM

## 2025-05-08 ENCOUNTER — ROUTINE PRENATAL (OUTPATIENT)
Dept: OBGYN CLINIC | Facility: CLINIC | Age: 25
End: 2025-05-08

## 2025-05-08 ENCOUNTER — PATIENT OUTREACH (OUTPATIENT)
Dept: OBGYN CLINIC | Facility: CLINIC | Age: 25
End: 2025-05-08

## 2025-05-08 VITALS
WEIGHT: 158.8 LBS | DIASTOLIC BLOOD PRESSURE: 65 MMHG | BODY MASS INDEX: 27.11 KG/M2 | RESPIRATION RATE: 18 BRPM | HEART RATE: 109 BPM | SYSTOLIC BLOOD PRESSURE: 95 MMHG | HEIGHT: 64 IN

## 2025-05-08 DIAGNOSIS — D50.9 IRON DEFICIENCY ANEMIA, UNSPECIFIED IRON DEFICIENCY ANEMIA TYPE: Primary | ICD-10-CM

## 2025-05-08 DIAGNOSIS — Z34.02 PRENATAL CARE, FIRST PREGNANCY IN SECOND TRIMESTER: Primary | ICD-10-CM

## 2025-05-08 DIAGNOSIS — Z3A.24 24 WEEKS GESTATION OF PREGNANCY: ICD-10-CM

## 2025-05-08 DIAGNOSIS — Z34.92 SECOND TRIMESTER PREGNANCY: ICD-10-CM

## 2025-05-08 NOTE — PROGRESS NOTES
Pt met with CELESTINO BENNETT due to pt relocating to [place], [place] and inquired if CELESTINO BENNETT could recommend OBGYN offices In the area that provide support to uninsured. CELESTINO BENNETT received pt email and provided pt with a website for services available in the area. CELESTINO BENNETT encouraged pt to f/u with her family that lives in the area to get personal recommendation. Pt is moving on Monday and denies need for f/u.